# Patient Record
Sex: FEMALE | Race: WHITE | Employment: OTHER | URBAN - METROPOLITAN AREA
[De-identification: names, ages, dates, MRNs, and addresses within clinical notes are randomized per-mention and may not be internally consistent; named-entity substitution may affect disease eponyms.]

---

## 2017-08-20 ENCOUNTER — HOSPITAL ENCOUNTER (EMERGENCY)
Age: 72
Discharge: HOME | DRG: 746 | End: 2017-08-20
Attending: EMERGENCY MEDICINE
Payer: MEDICARE

## 2017-08-20 VITALS
SYSTOLIC BLOOD PRESSURE: 172 MMHG | BODY MASS INDEX: 26.12 KG/M2 | TEMPERATURE: 97.7 F | RESPIRATION RATE: 16 BRPM | HEART RATE: 91 BPM | OXYGEN SATURATION: 96 % | WEIGHT: 153 LBS | DIASTOLIC BLOOD PRESSURE: 85 MMHG | HEIGHT: 64 IN

## 2017-08-20 DIAGNOSIS — L03.115 CELLULITIS OF RIGHT LOWER EXTREMITY: Primary | ICD-10-CM

## 2017-08-20 PROCEDURE — 99281 EMR DPT VST MAYX REQ PHY/QHP: CPT

## 2017-08-20 RX ORDER — CLINDAMYCIN HYDROCHLORIDE 150 MG/1
300 CAPSULE ORAL 4 TIMES DAILY
Qty: 56 CAPSULE | Refills: 0 | Status: SHIPPED | OUTPATIENT
Start: 2017-08-20 | End: 2017-08-27

## 2017-08-20 RX ORDER — CEPHALEXIN 500 MG/1
500 CAPSULE ORAL 4 TIMES DAILY
Qty: 28 CAPSULE | Refills: 0 | OUTPATIENT
Start: 2017-08-20 | End: 2017-08-20

## 2017-08-20 RX ORDER — SULFAMETHOXAZOLE AND TRIMETHOPRIM 800; 160 MG/1; MG/1
1 TABLET ORAL 2 TIMES DAILY
Qty: 14 TABLET | Refills: 0 | OUTPATIENT
Start: 2017-08-20 | End: 2017-08-20

## 2017-08-20 RX ORDER — CLINDAMYCIN HYDROCHLORIDE 150 MG/1
300 CAPSULE ORAL 4 TIMES DAILY
Qty: 56 CAPSULE | Refills: 0 | OUTPATIENT
Start: 2017-08-20 | End: 2017-08-20

## 2017-08-20 RX ORDER — HYDROCODONE BITARTRATE AND ACETAMINOPHEN 5; 325 MG/1; MG/1
1 TABLET ORAL EVERY 6 HOURS PRN
Qty: 10 TABLET | Refills: 0 | Status: SHIPPED | OUTPATIENT
Start: 2017-08-20 | End: 2017-08-27

## 2017-08-20 RX ORDER — HYDROCODONE BITARTRATE AND ACETAMINOPHEN 5; 325 MG/1; MG/1
1 TABLET ORAL EVERY 6 HOURS PRN
Qty: 10 TABLET | Refills: 0 | OUTPATIENT
Start: 2017-08-20 | End: 2017-08-20

## 2017-08-20 ASSESSMENT — PAIN SCALES - GENERAL: PAINLEVEL_OUTOF10: 7

## 2017-08-20 ASSESSMENT — PAIN DESCRIPTION - PAIN TYPE: TYPE: ACUTE PAIN

## 2017-08-21 ENCOUNTER — HOSPITAL ENCOUNTER (INPATIENT)
Age: 72
LOS: 3 days | Discharge: HOME HEALTH | DRG: 746 | End: 2017-08-24
Attending: EMERGENCY MEDICINE | Admitting: INTERNAL MEDICINE
Payer: MEDICARE

## 2017-08-21 DIAGNOSIS — R00.0 TACHYCARDIA: ICD-10-CM

## 2017-08-21 DIAGNOSIS — Z78.9 FAILURE OF OUTPATIENT TREATMENT: ICD-10-CM

## 2017-08-21 DIAGNOSIS — L03.317 CELLULITIS OF BUTTOCK: Primary | ICD-10-CM

## 2017-08-21 LAB
ABSOLUTE EOS #: 0.1 K/UL (ref 0–0.4)
ABSOLUTE LYMPH #: 1.3 K/UL (ref 1–4.8)
ABSOLUTE MONO #: 0.6 K/UL (ref 0.1–1.3)
ALBUMIN SERPL-MCNC: 4.3 G/DL (ref 3.5–5.2)
ALBUMIN/GLOBULIN RATIO: ABNORMAL (ref 1–2.5)
ALP BLD-CCNC: 75 U/L (ref 35–104)
ALT SERPL-CCNC: 14 U/L (ref 5–33)
ANION GAP SERPL CALCULATED.3IONS-SCNC: 16 MMOL/L (ref 9–17)
AST SERPL-CCNC: 17 U/L
BASOPHILS # BLD: 1 %
BASOPHILS ABSOLUTE: 0.1 K/UL (ref 0–0.2)
BILIRUB SERPL-MCNC: 0.49 MG/DL (ref 0.3–1.2)
BUN BLDV-MCNC: 14 MG/DL (ref 8–23)
BUN/CREAT BLD: ABNORMAL (ref 9–20)
CALCIUM SERPL-MCNC: 9 MG/DL (ref 8.6–10.4)
CHLORIDE BLD-SCNC: 103 MMOL/L (ref 98–107)
CO2: 22 MMOL/L (ref 20–31)
CREAT SERPL-MCNC: 0.59 MG/DL (ref 0.5–0.9)
DIFFERENTIAL TYPE: NORMAL
EOSINOPHILS RELATIVE PERCENT: 1 %
GFR AFRICAN AMERICAN: >60 ML/MIN
GFR NON-AFRICAN AMERICAN: >60 ML/MIN
GFR SERPL CREATININE-BSD FRML MDRD: ABNORMAL ML/MIN/{1.73_M2}
GFR SERPL CREATININE-BSD FRML MDRD: ABNORMAL ML/MIN/{1.73_M2}
GLUCOSE BLD-MCNC: 107 MG/DL (ref 70–99)
HCT VFR BLD CALC: 40.7 % (ref 36–46)
HEMOGLOBIN: 13.8 G/DL (ref 12–16)
LACTIC ACID, WHOLE BLOOD: NORMAL MMOL/L (ref 0.7–2.1)
LACTIC ACID: 0.9 MMOL/L (ref 0.5–2.2)
LYMPHOCYTES # BLD: 14 %
MCH RBC QN AUTO: 31.8 PG (ref 26–34)
MCHC RBC AUTO-ENTMCNC: 33.8 G/DL (ref 31–37)
MCV RBC AUTO: 93.9 FL (ref 80–100)
MONOCYTES # BLD: 7 %
PDW BLD-RTO: 13.9 % (ref 11.5–14.9)
PLATELET # BLD: 213 K/UL (ref 150–450)
PLATELET ESTIMATE: NORMAL
PMV BLD AUTO: 8.5 FL (ref 6–12)
POTASSIUM SERPL-SCNC: 3.6 MMOL/L (ref 3.7–5.3)
RBC # BLD: 4.33 M/UL (ref 4–5.2)
RBC # BLD: NORMAL 10*6/UL
SEG NEUTROPHILS: 77 %
SEGMENTED NEUTROPHILS ABSOLUTE COUNT: 7.2 K/UL (ref 1.3–9.1)
SODIUM BLD-SCNC: 141 MMOL/L (ref 135–144)
TOTAL PROTEIN: 7.5 G/DL (ref 6.4–8.3)
WBC # BLD: 9.3 K/UL (ref 3.5–11)
WBC # BLD: NORMAL 10*3/UL

## 2017-08-21 PROCEDURE — 83605 ASSAY OF LACTIC ACID: CPT

## 2017-08-21 PROCEDURE — 96374 THER/PROPH/DIAG INJ IV PUSH: CPT

## 2017-08-21 PROCEDURE — 83036 HEMOGLOBIN GLYCOSYLATED A1C: CPT

## 2017-08-21 PROCEDURE — 85025 COMPLETE CBC W/AUTO DIFF WBC: CPT

## 2017-08-21 PROCEDURE — 99284 EMERGENCY DEPT VISIT MOD MDM: CPT

## 2017-08-21 PROCEDURE — 2500000003 HC RX 250 WO HCPCS: Performed by: EMERGENCY MEDICINE

## 2017-08-21 PROCEDURE — 6360000002 HC RX W HCPCS: Performed by: EMERGENCY MEDICINE

## 2017-08-21 PROCEDURE — 1200000000 HC SEMI PRIVATE

## 2017-08-21 PROCEDURE — 80053 COMPREHEN METABOLIC PANEL: CPT

## 2017-08-21 PROCEDURE — 2580000003 HC RX 258: Performed by: EMERGENCY MEDICINE

## 2017-08-21 PROCEDURE — 36415 COLL VENOUS BLD VENIPUNCTURE: CPT

## 2017-08-21 RX ORDER — 0.9 % SODIUM CHLORIDE 0.9 %
1000 INTRAVENOUS SOLUTION INTRAVENOUS ONCE
Status: COMPLETED | OUTPATIENT
Start: 2017-08-21 | End: 2017-08-21

## 2017-08-21 RX ORDER — KETOROLAC TROMETHAMINE 30 MG/ML
30 INJECTION, SOLUTION INTRAMUSCULAR; INTRAVENOUS ONCE
Status: COMPLETED | OUTPATIENT
Start: 2017-08-21 | End: 2017-08-21

## 2017-08-21 RX ORDER — SODIUM CHLORIDE 0.9 % (FLUSH) 0.9 %
10 SYRINGE (ML) INJECTION EVERY 12 HOURS SCHEDULED
Status: DISCONTINUED | OUTPATIENT
Start: 2017-08-22 | End: 2017-08-24 | Stop reason: HOSPADM

## 2017-08-21 RX ORDER — SODIUM CHLORIDE 0.9 % (FLUSH) 0.9 %
10 SYRINGE (ML) INJECTION PRN
Status: DISCONTINUED | OUTPATIENT
Start: 2017-08-21 | End: 2017-08-24 | Stop reason: HOSPADM

## 2017-08-21 RX ORDER — SODIUM CHLORIDE 9 MG/ML
INJECTION, SOLUTION INTRAVENOUS CONTINUOUS
Status: DISCONTINUED | OUTPATIENT
Start: 2017-08-22 | End: 2017-08-24 | Stop reason: HOSPADM

## 2017-08-21 RX ADMIN — KETOROLAC TROMETHAMINE 30 MG: 30 INJECTION, SOLUTION INTRAMUSCULAR at 22:20

## 2017-08-21 RX ADMIN — DEXTROSE 600 MG: 50 INJECTION, SOLUTION INTRAVENOUS at 22:49

## 2017-08-21 RX ADMIN — SODIUM CHLORIDE 1000 ML: 9 INJECTION, SOLUTION INTRAVENOUS at 22:21

## 2017-08-21 ASSESSMENT — PAIN SCALES - GENERAL
PAINLEVEL_OUTOF10: 9
PAINLEVEL_OUTOF10: 9

## 2017-08-22 LAB
ESTIMATED AVERAGE GLUCOSE: 103 MG/DL
HBA1C MFR BLD: 5.2 % (ref 4–6)

## 2017-08-22 PROCEDURE — 6360000002 HC RX W HCPCS: Performed by: INTERNAL MEDICINE

## 2017-08-22 PROCEDURE — 2500000003 HC RX 250 WO HCPCS: Performed by: INTERNAL MEDICINE

## 2017-08-22 PROCEDURE — 2580000003 HC RX 258: Performed by: INTERNAL MEDICINE

## 2017-08-22 PROCEDURE — 99223 1ST HOSP IP/OBS HIGH 75: CPT | Performed by: INTERNAL MEDICINE

## 2017-08-22 PROCEDURE — 1200000000 HC SEMI PRIVATE

## 2017-08-22 PROCEDURE — 6370000000 HC RX 637 (ALT 250 FOR IP): Performed by: INTERNAL MEDICINE

## 2017-08-22 RX ORDER — PAROXETINE 10 MG/1
10 TABLET, FILM COATED ORAL NIGHTLY
COMMUNITY

## 2017-08-22 RX ORDER — PAROXETINE 10 MG/1
10 TABLET, FILM COATED ORAL NIGHTLY
Status: DISCONTINUED | OUTPATIENT
Start: 2017-08-22 | End: 2017-08-23

## 2017-08-22 RX ORDER — ZOLPIDEM TARTRATE 5 MG/1
5 TABLET ORAL NIGHTLY PRN
COMMUNITY

## 2017-08-22 RX ORDER — ONDANSETRON 2 MG/ML
4 INJECTION INTRAMUSCULAR; INTRAVENOUS EVERY 6 HOURS PRN
Status: DISCONTINUED | OUTPATIENT
Start: 2017-08-22 | End: 2017-08-23 | Stop reason: SDUPTHER

## 2017-08-22 RX ORDER — HYDROCODONE BITARTRATE AND ACETAMINOPHEN 5; 325 MG/1; MG/1
0.5 TABLET ORAL EVERY 6 HOURS PRN
Status: CANCELLED | OUTPATIENT
Start: 2017-08-22

## 2017-08-22 RX ORDER — ZOLPIDEM TARTRATE 5 MG/1
5 TABLET ORAL NIGHTLY PRN
Status: DISCONTINUED | OUTPATIENT
Start: 2017-08-22 | End: 2017-08-24 | Stop reason: HOSPADM

## 2017-08-22 RX ORDER — HYDROCODONE BITARTRATE AND ACETAMINOPHEN 5; 325 MG/1; MG/1
1 TABLET ORAL EVERY 6 HOURS PRN
Status: DISCONTINUED | OUTPATIENT
Start: 2017-08-22 | End: 2017-08-24 | Stop reason: HOSPADM

## 2017-08-22 RX ORDER — ZOLPIDEM TARTRATE 5 MG/1
5 TABLET ORAL NIGHTLY PRN
Status: DISCONTINUED | OUTPATIENT
Start: 2017-08-22 | End: 2017-08-22

## 2017-08-22 RX ORDER — HYDROCODONE BITARTRATE AND ACETAMINOPHEN 5; 325 MG/1; MG/1
0.5 TABLET ORAL EVERY 6 HOURS PRN
Status: DISCONTINUED | OUTPATIENT
Start: 2017-08-22 | End: 2017-08-24 | Stop reason: HOSPADM

## 2017-08-22 RX ADMIN — ONDANSETRON 4 MG: 2 INJECTION INTRAMUSCULAR; INTRAVENOUS at 15:31

## 2017-08-22 RX ADMIN — SODIUM CHLORIDE: 9 INJECTION, SOLUTION INTRAVENOUS at 01:08

## 2017-08-22 RX ADMIN — ZOLPIDEM TARTRATE 5 MG: 5 TABLET, FILM COATED ORAL at 21:31

## 2017-08-22 RX ADMIN — ONDANSETRON 4 MG: 2 INJECTION INTRAMUSCULAR; INTRAVENOUS at 10:13

## 2017-08-22 RX ADMIN — ENOXAPARIN SODIUM 40 MG: 40 INJECTION SUBCUTANEOUS at 10:13

## 2017-08-22 RX ADMIN — PAROXETINE 10 MG: 10 TABLET, FILM COATED ORAL at 21:16

## 2017-08-22 RX ADMIN — HYDROCODONE BITARTRATE AND ACETAMINOPHEN 1 TABLET: 5; 325 TABLET ORAL at 15:31

## 2017-08-22 RX ADMIN — ONDANSETRON 4 MG: 2 INJECTION INTRAMUSCULAR; INTRAVENOUS at 21:23

## 2017-08-22 RX ADMIN — PAROXETINE 10 MG: 10 TABLET, FILM COATED ORAL at 01:08

## 2017-08-22 RX ADMIN — CLINDAMYCIN PHOSPHATE 600 MG: 150 INJECTION, SOLUTION, CONCENTRATE INTRAVENOUS at 17:27

## 2017-08-22 RX ADMIN — HYDROCODONE BITARTRATE AND ACETAMINOPHEN 1 TABLET: 5; 325 TABLET ORAL at 10:13

## 2017-08-22 RX ADMIN — CLINDAMYCIN PHOSPHATE 600 MG: 150 INJECTION, SOLUTION, CONCENTRATE INTRAVENOUS at 04:42

## 2017-08-22 RX ADMIN — HYDROCODONE BITARTRATE AND ACETAMINOPHEN 1 TABLET: 5; 325 TABLET ORAL at 21:23

## 2017-08-22 RX ADMIN — ZOLPIDEM TARTRATE 5 MG: 5 TABLET, FILM COATED ORAL at 01:08

## 2017-08-22 ASSESSMENT — PAIN SCALES - GENERAL
PAINLEVEL_OUTOF10: 4
PAINLEVEL_OUTOF10: 8
PAINLEVEL_OUTOF10: 8
PAINLEVEL_OUTOF10: 4
PAINLEVEL_OUTOF10: 5
PAINLEVEL_OUTOF10: 3

## 2017-08-22 ASSESSMENT — PAIN DESCRIPTION - FREQUENCY: FREQUENCY: CONTINUOUS

## 2017-08-22 ASSESSMENT — PAIN DESCRIPTION - LOCATION
LOCATION: BUTTOCKS
LOCATION: BUTTOCKS

## 2017-08-22 ASSESSMENT — PAIN DESCRIPTION - PAIN TYPE
TYPE: ACUTE PAIN
TYPE: ACUTE PAIN

## 2017-08-22 ASSESSMENT — PAIN DESCRIPTION - DESCRIPTORS: DESCRIPTORS: PRESSURE

## 2017-08-23 ENCOUNTER — ANESTHESIA EVENT (OUTPATIENT)
Dept: OPERATING ROOM | Age: 72
DRG: 746 | End: 2017-08-23
Payer: MEDICARE

## 2017-08-23 ENCOUNTER — ANESTHESIA (OUTPATIENT)
Dept: OPERATING ROOM | Age: 72
DRG: 746 | End: 2017-08-23
Payer: MEDICARE

## 2017-08-23 VITALS — OXYGEN SATURATION: 98 % | DIASTOLIC BLOOD PRESSURE: 51 MMHG | SYSTOLIC BLOOD PRESSURE: 81 MMHG

## 2017-08-23 LAB
ANION GAP SERPL CALCULATED.3IONS-SCNC: 14 MMOL/L (ref 9–17)
BUN BLDV-MCNC: 9 MG/DL (ref 8–23)
BUN/CREAT BLD: NORMAL (ref 9–20)
CALCIUM SERPL-MCNC: 8.7 MG/DL (ref 8.6–10.4)
CHLORIDE BLD-SCNC: 107 MMOL/L (ref 98–107)
CO2: 21 MMOL/L (ref 20–31)
CREAT SERPL-MCNC: 0.52 MG/DL (ref 0.5–0.9)
GFR AFRICAN AMERICAN: >60 ML/MIN
GFR NON-AFRICAN AMERICAN: >60 ML/MIN
GFR SERPL CREATININE-BSD FRML MDRD: NORMAL ML/MIN/{1.73_M2}
GFR SERPL CREATININE-BSD FRML MDRD: NORMAL ML/MIN/{1.73_M2}
GLUCOSE BLD-MCNC: 95 MG/DL (ref 70–99)
HCT VFR BLD CALC: 38.9 % (ref 36–46)
HEMOGLOBIN: 13.4 G/DL (ref 12–16)
MCH RBC QN AUTO: 31.9 PG (ref 26–34)
MCHC RBC AUTO-ENTMCNC: 34.4 G/DL (ref 31–37)
MCV RBC AUTO: 92.7 FL (ref 80–100)
PDW BLD-RTO: 13.7 % (ref 11.5–14.9)
PLATELET # BLD: 212 K/UL (ref 150–450)
PMV BLD AUTO: 8.4 FL (ref 6–12)
POTASSIUM SERPL-SCNC: 4 MMOL/L (ref 3.7–5.3)
RBC # BLD: 4.2 M/UL (ref 4–5.2)
SODIUM BLD-SCNC: 142 MMOL/L (ref 135–144)
WBC # BLD: 5.6 K/UL (ref 3.5–11)

## 2017-08-23 PROCEDURE — 1200000000 HC SEMI PRIVATE

## 2017-08-23 PROCEDURE — 3600000002 HC SURGERY LEVEL 2 BASE: Performed by: SURGERY

## 2017-08-23 PROCEDURE — 99232 SBSQ HOSP IP/OBS MODERATE 35: CPT | Performed by: INTERNAL MEDICINE

## 2017-08-23 PROCEDURE — 87070 CULTURE OTHR SPECIMN AEROBIC: CPT

## 2017-08-23 PROCEDURE — 7100000000 HC PACU RECOVERY - FIRST 15 MIN: Performed by: SURGERY

## 2017-08-23 PROCEDURE — 2580000003 HC RX 258: Performed by: INTERNAL MEDICINE

## 2017-08-23 PROCEDURE — 87075 CULTR BACTERIA EXCEPT BLOOD: CPT

## 2017-08-23 PROCEDURE — 7100000001 HC PACU RECOVERY - ADDTL 15 MIN: Performed by: SURGERY

## 2017-08-23 PROCEDURE — 36415 COLL VENOUS BLD VENIPUNCTURE: CPT

## 2017-08-23 PROCEDURE — 6360000002 HC RX W HCPCS: Performed by: INTERNAL MEDICINE

## 2017-08-23 PROCEDURE — 87186 SC STD MICRODIL/AGAR DIL: CPT

## 2017-08-23 PROCEDURE — 87205 SMEAR GRAM STAIN: CPT

## 2017-08-23 PROCEDURE — 6370000000 HC RX 637 (ALT 250 FOR IP): Performed by: INTERNAL MEDICINE

## 2017-08-23 PROCEDURE — 6360000002 HC RX W HCPCS: Performed by: NURSE ANESTHETIST, CERTIFIED REGISTERED

## 2017-08-23 PROCEDURE — 6370000000 HC RX 637 (ALT 250 FOR IP): Performed by: SURGERY

## 2017-08-23 PROCEDURE — 85027 COMPLETE CBC AUTOMATED: CPT

## 2017-08-23 PROCEDURE — 86403 PARTICLE AGGLUT ANTBDY SCRN: CPT

## 2017-08-23 PROCEDURE — 2500000003 HC RX 250 WO HCPCS: Performed by: SURGERY

## 2017-08-23 PROCEDURE — 3700000000 HC ANESTHESIA ATTENDED CARE: Performed by: SURGERY

## 2017-08-23 PROCEDURE — 3600000012 HC SURGERY LEVEL 2 ADDTL 15MIN: Performed by: SURGERY

## 2017-08-23 PROCEDURE — 0U9M0ZZ DRAINAGE OF VULVA, OPEN APPROACH: ICD-10-PCS | Performed by: INTERNAL MEDICINE

## 2017-08-23 PROCEDURE — 2500000003 HC RX 250 WO HCPCS: Performed by: INTERNAL MEDICINE

## 2017-08-23 PROCEDURE — 0Y900ZZ DRAINAGE OF RIGHT BUTTOCK, OPEN APPROACH: ICD-10-PCS | Performed by: INTERNAL MEDICINE

## 2017-08-23 PROCEDURE — 2580000003 HC RX 258: Performed by: SURGERY

## 2017-08-23 PROCEDURE — 3700000001 HC ADD 15 MINUTES (ANESTHESIA): Performed by: SURGERY

## 2017-08-23 PROCEDURE — 2500000003 HC RX 250 WO HCPCS: Performed by: NURSE ANESTHETIST, CERTIFIED REGISTERED

## 2017-08-23 PROCEDURE — 6360000002 HC RX W HCPCS: Performed by: SURGERY

## 2017-08-23 PROCEDURE — 80048 BASIC METABOLIC PNL TOTAL CA: CPT

## 2017-08-23 RX ORDER — LIDOCAINE HYDROCHLORIDE 10 MG/ML
INJECTION, SOLUTION EPIDURAL; INFILTRATION; INTRACAUDAL; PERINEURAL PRN
Status: DISCONTINUED | OUTPATIENT
Start: 2017-08-23 | End: 2017-08-23 | Stop reason: SDUPTHER

## 2017-08-23 RX ORDER — OXYCODONE HYDROCHLORIDE AND ACETAMINOPHEN 5; 325 MG/1; MG/1
1 TABLET ORAL EVERY 4 HOURS PRN
Status: DISCONTINUED | OUTPATIENT
Start: 2017-08-23 | End: 2017-08-24 | Stop reason: HOSPADM

## 2017-08-23 RX ORDER — DIPHENHYDRAMINE HYDROCHLORIDE 50 MG/ML
12.5 INJECTION INTRAMUSCULAR; INTRAVENOUS
Status: DISCONTINUED | OUTPATIENT
Start: 2017-08-23 | End: 2017-08-23 | Stop reason: HOSPADM

## 2017-08-23 RX ORDER — LABETALOL HYDROCHLORIDE 5 MG/ML
5 INJECTION, SOLUTION INTRAVENOUS EVERY 10 MIN PRN
Status: DISCONTINUED | OUTPATIENT
Start: 2017-08-23 | End: 2017-08-23 | Stop reason: HOSPADM

## 2017-08-23 RX ORDER — ONDANSETRON 2 MG/ML
4 INJECTION INTRAMUSCULAR; INTRAVENOUS
Status: DISCONTINUED | OUTPATIENT
Start: 2017-08-23 | End: 2017-08-23 | Stop reason: HOSPADM

## 2017-08-23 RX ORDER — MEPERIDINE HYDROCHLORIDE 25 MG/ML
12.5 INJECTION INTRAMUSCULAR; INTRAVENOUS; SUBCUTANEOUS EVERY 5 MIN PRN
Status: DISCONTINUED | OUTPATIENT
Start: 2017-08-23 | End: 2017-08-23 | Stop reason: HOSPADM

## 2017-08-23 RX ORDER — ACETAMINOPHEN 325 MG/1
650 TABLET ORAL EVERY 4 HOURS PRN
Status: DISCONTINUED | OUTPATIENT
Start: 2017-08-23 | End: 2017-08-24 | Stop reason: HOSPADM

## 2017-08-23 RX ORDER — SODIUM CHLORIDE 9 MG/ML
INJECTION, SOLUTION INTRAVENOUS CONTINUOUS
Status: DISCONTINUED | OUTPATIENT
Start: 2017-08-23 | End: 2017-08-24 | Stop reason: HOSPADM

## 2017-08-23 RX ORDER — SODIUM CHLORIDE 0.9 % (FLUSH) 0.9 %
10 SYRINGE (ML) INJECTION EVERY 12 HOURS SCHEDULED
Status: DISCONTINUED | OUTPATIENT
Start: 2017-08-23 | End: 2017-08-24 | Stop reason: HOSPADM

## 2017-08-23 RX ORDER — BUPIVACAINE HYDROCHLORIDE 5 MG/ML
INJECTION, SOLUTION EPIDURAL; INTRACAUDAL PRN
Status: DISCONTINUED | OUTPATIENT
Start: 2017-08-23 | End: 2017-08-23 | Stop reason: HOSPADM

## 2017-08-23 RX ORDER — MORPHINE SULFATE 4 MG/ML
2 INJECTION, SOLUTION INTRAMUSCULAR; INTRAVENOUS
Status: DISCONTINUED | OUTPATIENT
Start: 2017-08-23 | End: 2017-08-24 | Stop reason: HOSPADM

## 2017-08-23 RX ORDER — MORPHINE SULFATE 2 MG/ML
1 INJECTION, SOLUTION INTRAMUSCULAR; INTRAVENOUS EVERY 5 MIN PRN
Status: DISCONTINUED | OUTPATIENT
Start: 2017-08-23 | End: 2017-08-23 | Stop reason: HOSPADM

## 2017-08-23 RX ORDER — PROPOFOL 10 MG/ML
INJECTION, EMULSION INTRAVENOUS PRN
Status: DISCONTINUED | OUTPATIENT
Start: 2017-08-23 | End: 2017-08-23 | Stop reason: SDUPTHER

## 2017-08-23 RX ORDER — SODIUM CHLORIDE 0.9 % (FLUSH) 0.9 %
10 SYRINGE (ML) INJECTION PRN
Status: DISCONTINUED | OUTPATIENT
Start: 2017-08-23 | End: 2017-08-24 | Stop reason: HOSPADM

## 2017-08-23 RX ORDER — OXYCODONE HYDROCHLORIDE AND ACETAMINOPHEN 5; 325 MG/1; MG/1
2 TABLET ORAL EVERY 4 HOURS PRN
Status: DISCONTINUED | OUTPATIENT
Start: 2017-08-23 | End: 2017-08-24 | Stop reason: HOSPADM

## 2017-08-23 RX ORDER — HYDROCODONE BITARTRATE AND ACETAMINOPHEN 5; 325 MG/1; MG/1
1 TABLET ORAL PRN
Status: DISCONTINUED | OUTPATIENT
Start: 2017-08-23 | End: 2017-08-23 | Stop reason: HOSPADM

## 2017-08-23 RX ORDER — FENTANYL CITRATE 50 UG/ML
25 INJECTION, SOLUTION INTRAMUSCULAR; INTRAVENOUS EVERY 5 MIN PRN
Status: DISCONTINUED | OUTPATIENT
Start: 2017-08-23 | End: 2017-08-23 | Stop reason: HOSPADM

## 2017-08-23 RX ORDER — HYDROCODONE BITARTRATE AND ACETAMINOPHEN 5; 325 MG/1; MG/1
2 TABLET ORAL PRN
Status: DISCONTINUED | OUTPATIENT
Start: 2017-08-23 | End: 2017-08-23 | Stop reason: HOSPADM

## 2017-08-23 RX ORDER — HYDRALAZINE HYDROCHLORIDE 20 MG/ML
5 INJECTION INTRAMUSCULAR; INTRAVENOUS EVERY 10 MIN PRN
Status: DISCONTINUED | OUTPATIENT
Start: 2017-08-23 | End: 2017-08-23 | Stop reason: HOSPADM

## 2017-08-23 RX ORDER — PAROXETINE HYDROCHLORIDE 20 MG/1
20 TABLET, FILM COATED ORAL NIGHTLY
Status: DISCONTINUED | OUTPATIENT
Start: 2017-08-23 | End: 2017-08-24 | Stop reason: HOSPADM

## 2017-08-23 RX ORDER — METOCLOPRAMIDE HYDROCHLORIDE 5 MG/ML
10 INJECTION INTRAMUSCULAR; INTRAVENOUS
Status: DISCONTINUED | OUTPATIENT
Start: 2017-08-23 | End: 2017-08-23 | Stop reason: HOSPADM

## 2017-08-23 RX ORDER — FENTANYL CITRATE 50 UG/ML
INJECTION, SOLUTION INTRAMUSCULAR; INTRAVENOUS PRN
Status: DISCONTINUED | OUTPATIENT
Start: 2017-08-23 | End: 2017-08-23 | Stop reason: SDUPTHER

## 2017-08-23 RX ORDER — FENTANYL CITRATE 50 UG/ML
50 INJECTION, SOLUTION INTRAMUSCULAR; INTRAVENOUS EVERY 5 MIN PRN
Status: DISCONTINUED | OUTPATIENT
Start: 2017-08-23 | End: 2017-08-23 | Stop reason: HOSPADM

## 2017-08-23 RX ORDER — MORPHINE SULFATE 4 MG/ML
4 INJECTION, SOLUTION INTRAMUSCULAR; INTRAVENOUS
Status: DISCONTINUED | OUTPATIENT
Start: 2017-08-23 | End: 2017-08-24 | Stop reason: HOSPADM

## 2017-08-23 RX ORDER — DEXAMETHASONE SODIUM PHOSPHATE 4 MG/ML
INJECTION, SOLUTION INTRA-ARTICULAR; INTRALESIONAL; INTRAMUSCULAR; INTRAVENOUS; SOFT TISSUE PRN
Status: DISCONTINUED | OUTPATIENT
Start: 2017-08-23 | End: 2017-08-23 | Stop reason: SDUPTHER

## 2017-08-23 RX ORDER — ONDANSETRON 2 MG/ML
INJECTION INTRAMUSCULAR; INTRAVENOUS PRN
Status: DISCONTINUED | OUTPATIENT
Start: 2017-08-23 | End: 2017-08-23 | Stop reason: SDUPTHER

## 2017-08-23 RX ORDER — ONDANSETRON 2 MG/ML
4 INJECTION INTRAMUSCULAR; INTRAVENOUS EVERY 6 HOURS PRN
Status: DISCONTINUED | OUTPATIENT
Start: 2017-08-23 | End: 2017-08-24 | Stop reason: HOSPADM

## 2017-08-23 RX ORDER — MIDAZOLAM HYDROCHLORIDE 1 MG/ML
INJECTION INTRAMUSCULAR; INTRAVENOUS PRN
Status: DISCONTINUED | OUTPATIENT
Start: 2017-08-23 | End: 2017-08-23 | Stop reason: SDUPTHER

## 2017-08-23 RX ADMIN — ZOLPIDEM TARTRATE 5 MG: 5 TABLET, FILM COATED ORAL at 22:19

## 2017-08-23 RX ADMIN — SODIUM CHLORIDE: 9 INJECTION, SOLUTION INTRAVENOUS at 13:37

## 2017-08-23 RX ADMIN — CLINDAMYCIN PHOSPHATE 600 MG: 150 INJECTION, SOLUTION, CONCENTRATE INTRAVENOUS at 01:57

## 2017-08-23 RX ADMIN — FENTANYL CITRATE 50 MCG: 50 INJECTION, SOLUTION INTRAMUSCULAR; INTRAVENOUS at 13:39

## 2017-08-23 RX ADMIN — ONDANSETRON 4 MG: 2 INJECTION INTRAMUSCULAR; INTRAVENOUS at 13:59

## 2017-08-23 RX ADMIN — ONDANSETRON 4 MG: 2 INJECTION INTRAMUSCULAR; INTRAVENOUS at 10:43

## 2017-08-23 RX ADMIN — HYDROCODONE BITARTRATE AND ACETAMINOPHEN 1 TABLET: 5; 325 TABLET ORAL at 10:43

## 2017-08-23 RX ADMIN — CLINDAMYCIN PHOSPHATE 600 MG: 150 INJECTION, SOLUTION, CONCENTRATE INTRAVENOUS at 17:11

## 2017-08-23 RX ADMIN — LIDOCAINE HYDROCHLORIDE 50 MG: 10 INJECTION, SOLUTION EPIDURAL; INFILTRATION; INTRACAUDAL; PERINEURAL at 13:39

## 2017-08-23 RX ADMIN — SODIUM CHLORIDE: 9 INJECTION, SOLUTION INTRAVENOUS at 22:19

## 2017-08-23 RX ADMIN — PROPOFOL 150 MG: 10 INJECTION, EMULSION INTRAVENOUS at 13:39

## 2017-08-23 RX ADMIN — OXYCODONE HYDROCHLORIDE AND ACETAMINOPHEN 2 TABLET: 5; 325 TABLET ORAL at 18:32

## 2017-08-23 RX ADMIN — SODIUM CHLORIDE: 9 INJECTION, SOLUTION INTRAVENOUS at 14:22

## 2017-08-23 RX ADMIN — ONDANSETRON 4 MG: 2 INJECTION INTRAMUSCULAR; INTRAVENOUS at 18:32

## 2017-08-23 RX ADMIN — PAROXETINE HYDROCHLORIDE 20 MG: 10 TABLET, FILM COATED ORAL at 22:19

## 2017-08-23 RX ADMIN — DEXAMETHASONE SODIUM PHOSPHATE 8 MG: 4 INJECTION, SOLUTION INTRAMUSCULAR; INTRAVENOUS at 13:46

## 2017-08-23 RX ADMIN — MIDAZOLAM 2 MG: 1 INJECTION INTRAMUSCULAR; INTRAVENOUS at 13:37

## 2017-08-23 RX ADMIN — CLINDAMYCIN PHOSPHATE 600 MG: 150 INJECTION, SOLUTION, CONCENTRATE INTRAVENOUS at 10:56

## 2017-08-23 RX ADMIN — SODIUM CHLORIDE: 9 INJECTION, SOLUTION INTRAVENOUS at 17:10

## 2017-08-23 RX ADMIN — FENTANYL CITRATE 50 MCG: 50 INJECTION, SOLUTION INTRAMUSCULAR; INTRAVENOUS at 13:50

## 2017-08-23 ASSESSMENT — PAIN SCALES - GENERAL
PAINLEVEL_OUTOF10: 3
PAINLEVEL_OUTOF10: 0
PAINLEVEL_OUTOF10: 3
PAINLEVEL_OUTOF10: 6
PAINLEVEL_OUTOF10: 8

## 2017-08-24 VITALS
TEMPERATURE: 98 F | DIASTOLIC BLOOD PRESSURE: 67 MMHG | RESPIRATION RATE: 16 BRPM | WEIGHT: 150 LBS | HEART RATE: 97 BPM | HEIGHT: 64 IN | OXYGEN SATURATION: 99 % | BODY MASS INDEX: 25.61 KG/M2 | SYSTOLIC BLOOD PRESSURE: 133 MMHG

## 2017-08-24 LAB
ANION GAP SERPL CALCULATED.3IONS-SCNC: 15 MMOL/L (ref 9–17)
BUN BLDV-MCNC: 10 MG/DL (ref 8–23)
BUN/CREAT BLD: ABNORMAL (ref 9–20)
CALCIUM SERPL-MCNC: 8.6 MG/DL (ref 8.6–10.4)
CHLORIDE BLD-SCNC: 106 MMOL/L (ref 98–107)
CO2: 19 MMOL/L (ref 20–31)
CREAT SERPL-MCNC: 0.5 MG/DL (ref 0.5–0.9)
GFR AFRICAN AMERICAN: >60 ML/MIN
GFR NON-AFRICAN AMERICAN: >60 ML/MIN
GFR SERPL CREATININE-BSD FRML MDRD: ABNORMAL ML/MIN/{1.73_M2}
GFR SERPL CREATININE-BSD FRML MDRD: ABNORMAL ML/MIN/{1.73_M2}
GLUCOSE BLD-MCNC: 93 MG/DL (ref 70–99)
HCT VFR BLD CALC: 38.2 % (ref 36–46)
HEMOGLOBIN: 12.6 G/DL (ref 12–16)
MCH RBC QN AUTO: 31.3 PG (ref 26–34)
MCHC RBC AUTO-ENTMCNC: 33.1 G/DL (ref 31–37)
MCV RBC AUTO: 94.5 FL (ref 80–100)
PDW BLD-RTO: 13.9 % (ref 11.5–14.9)
PLATELET # BLD: 243 K/UL (ref 150–450)
PMV BLD AUTO: 8.1 FL (ref 6–12)
POTASSIUM SERPL-SCNC: 3.7 MMOL/L (ref 3.7–5.3)
RBC # BLD: 4.04 M/UL (ref 4–5.2)
SODIUM BLD-SCNC: 140 MMOL/L (ref 135–144)
WBC # BLD: 7 K/UL (ref 3.5–11)

## 2017-08-24 PROCEDURE — 2500000003 HC RX 250 WO HCPCS: Performed by: INTERNAL MEDICINE

## 2017-08-24 PROCEDURE — 6370000000 HC RX 637 (ALT 250 FOR IP): Performed by: SURGERY

## 2017-08-24 PROCEDURE — 99239 HOSP IP/OBS DSCHRG MGMT >30: CPT | Performed by: INTERNAL MEDICINE

## 2017-08-24 PROCEDURE — 6360000002 HC RX W HCPCS: Performed by: SURGERY

## 2017-08-24 PROCEDURE — 85027 COMPLETE CBC AUTOMATED: CPT

## 2017-08-24 PROCEDURE — 2580000003 HC RX 258: Performed by: INTERNAL MEDICINE

## 2017-08-24 PROCEDURE — 80048 BASIC METABOLIC PNL TOTAL CA: CPT

## 2017-08-24 PROCEDURE — 6370000000 HC RX 637 (ALT 250 FOR IP): Performed by: INTERNAL MEDICINE

## 2017-08-24 PROCEDURE — 36415 COLL VENOUS BLD VENIPUNCTURE: CPT

## 2017-08-24 RX ORDER — ONDANSETRON 4 MG/1
4 TABLET, FILM COATED ORAL DAILY PRN
Qty: 30 TABLET | Refills: 0 | Status: SHIPPED | OUTPATIENT
Start: 2017-08-24 | End: 2021-08-10

## 2017-08-24 RX ORDER — DOXYCYCLINE HYCLATE 100 MG/1
100 CAPSULE ORAL 2 TIMES DAILY
Qty: 20 CAPSULE | Refills: 0 | Status: SHIPPED | OUTPATIENT
Start: 2017-08-24 | End: 2017-09-03

## 2017-08-24 RX ADMIN — CLINDAMYCIN PHOSPHATE 600 MG: 150 INJECTION, SOLUTION, CONCENTRATE INTRAVENOUS at 01:07

## 2017-08-24 RX ADMIN — ONDANSETRON 4 MG: 2 INJECTION INTRAMUSCULAR; INTRAVENOUS at 10:40

## 2017-08-24 RX ADMIN — ONDANSETRON 4 MG: 2 INJECTION INTRAMUSCULAR; INTRAVENOUS at 00:24

## 2017-08-24 RX ADMIN — CLINDAMYCIN PHOSPHATE 600 MG: 150 INJECTION, SOLUTION, CONCENTRATE INTRAVENOUS at 10:36

## 2017-08-24 RX ADMIN — OXYCODONE HYDROCHLORIDE AND ACETAMINOPHEN 1 TABLET: 5; 325 TABLET ORAL at 10:40

## 2017-08-24 RX ADMIN — HYDROCODONE BITARTRATE AND ACETAMINOPHEN 1 TABLET: 5; 325 TABLET ORAL at 04:31

## 2017-08-24 RX ADMIN — ENOXAPARIN SODIUM 40 MG: 40 INJECTION SUBCUTANEOUS at 08:18

## 2017-08-24 ASSESSMENT — PAIN SCALES - GENERAL
PAINLEVEL_OUTOF10: 3
PAINLEVEL_OUTOF10: 2
PAINLEVEL_OUTOF10: 3
PAINLEVEL_OUTOF10: 7
PAINLEVEL_OUTOF10: 1

## 2017-08-28 LAB
CULTURE: ABNORMAL
DIRECT EXAM: ABNORMAL
DIRECT EXAM: ABNORMAL
Lab: ABNORMAL
ORGANISM: ABNORMAL
SPECIMEN DESCRIPTION: ABNORMAL
SPECIMEN DESCRIPTION: ABNORMAL
STATUS: ABNORMAL

## 2017-09-28 ENCOUNTER — HOSPITAL ENCOUNTER (OUTPATIENT)
Dept: WOMENS IMAGING | Age: 72
Discharge: HOME | End: 2017-09-28
Payer: MEDICARE

## 2017-09-28 DIAGNOSIS — Z12.31 VISIT FOR SCREENING MAMMOGRAM: ICD-10-CM

## 2017-09-28 PROCEDURE — 77063 BREAST TOMOSYNTHESIS BI: CPT

## 2017-10-04 ENCOUNTER — HOSPITAL ENCOUNTER (OUTPATIENT)
Dept: WOMENS IMAGING | Age: 72
Discharge: HOME | End: 2017-10-04
Payer: MEDICARE

## 2017-10-04 DIAGNOSIS — R92.8 ABNORMAL MAMMOGRAM: ICD-10-CM

## 2017-10-04 PROCEDURE — 76642 ULTRASOUND BREAST LIMITED: CPT

## 2018-11-05 ENCOUNTER — HOSPITAL ENCOUNTER (OUTPATIENT)
Dept: WOMENS IMAGING | Age: 73
Discharge: HOME | End: 2018-11-07
Payer: MEDICARE

## 2018-11-05 DIAGNOSIS — Z12.31 ENCOUNTER FOR SCREENING MAMMOGRAM FOR BREAST CANCER: ICD-10-CM

## 2018-11-05 PROCEDURE — 77063 BREAST TOMOSYNTHESIS BI: CPT

## 2020-09-21 ENCOUNTER — HOSPITAL ENCOUNTER (OUTPATIENT)
Dept: WOMENS IMAGING | Age: 75
Discharge: HOME | End: 2020-09-23
Payer: MEDICARE

## 2020-09-21 PROCEDURE — 77063 BREAST TOMOSYNTHESIS BI: CPT

## 2021-08-02 ENCOUNTER — APPOINTMENT (OUTPATIENT)
Dept: GENERAL RADIOLOGY | Age: 76
End: 2021-08-02
Payer: MEDICARE

## 2021-08-02 ENCOUNTER — HOSPITAL ENCOUNTER (EMERGENCY)
Age: 76
Discharge: HOME | End: 2021-08-02
Attending: EMERGENCY MEDICINE
Payer: MEDICARE

## 2021-08-02 VITALS
RESPIRATION RATE: 16 BRPM | OXYGEN SATURATION: 97 % | HEIGHT: 64 IN | DIASTOLIC BLOOD PRESSURE: 90 MMHG | HEART RATE: 87 BPM | BODY MASS INDEX: 26.12 KG/M2 | TEMPERATURE: 98.4 F | SYSTOLIC BLOOD PRESSURE: 178 MMHG | WEIGHT: 153 LBS

## 2021-08-02 DIAGNOSIS — S52.501A CLOSED FRACTURE OF DISTAL END OF RIGHT RADIUS, UNSPECIFIED FRACTURE MORPHOLOGY, INITIAL ENCOUNTER: Primary | ICD-10-CM

## 2021-08-02 PROCEDURE — 90715 TDAP VACCINE 7 YRS/> IM: CPT | Performed by: EMERGENCY MEDICINE

## 2021-08-02 PROCEDURE — 90471 IMMUNIZATION ADMIN: CPT | Performed by: EMERGENCY MEDICINE

## 2021-08-02 PROCEDURE — 29125 APPL SHORT ARM SPLINT STATIC: CPT

## 2021-08-02 PROCEDURE — 73130 X-RAY EXAM OF HAND: CPT

## 2021-08-02 PROCEDURE — 73110 X-RAY EXAM OF WRIST: CPT

## 2021-08-02 PROCEDURE — 6360000002 HC RX W HCPCS: Performed by: EMERGENCY MEDICINE

## 2021-08-02 PROCEDURE — 6370000000 HC RX 637 (ALT 250 FOR IP): Performed by: EMERGENCY MEDICINE

## 2021-08-02 PROCEDURE — 99283 EMERGENCY DEPT VISIT LOW MDM: CPT

## 2021-08-02 RX ORDER — HYDROCODONE BITARTRATE AND ACETAMINOPHEN 5; 325 MG/1; MG/1
1 TABLET ORAL EVERY 6 HOURS PRN
Qty: 10 TABLET | Refills: 0 | Status: SHIPPED | OUTPATIENT
Start: 2021-08-02 | End: 2021-08-12 | Stop reason: SDUPTHER

## 2021-08-02 RX ORDER — ACETAMINOPHEN 500 MG
1000 TABLET ORAL ONCE
Status: COMPLETED | OUTPATIENT
Start: 2021-08-02 | End: 2021-08-02

## 2021-08-02 RX ADMIN — TETANUS TOXOID, REDUCED DIPHTHERIA TOXOID AND ACELLULAR PERTUSSIS VACCINE, ADSORBED 0.5 ML: 5; 2.5; 8; 8; 2.5 SUSPENSION INTRAMUSCULAR at 21:36

## 2021-08-02 RX ADMIN — ACETAMINOPHEN 1000 MG: 500 TABLET ORAL at 20:40

## 2021-08-02 ASSESSMENT — PAIN SCALES - GENERAL
PAINLEVEL_OUTOF10: 2
PAINLEVEL_OUTOF10: 2

## 2021-08-02 ASSESSMENT — PAIN DESCRIPTION - PAIN TYPE: TYPE: ACUTE PAIN

## 2021-08-02 ASSESSMENT — ENCOUNTER SYMPTOMS
SHORTNESS OF BREATH: 0
DIARRHEA: 0
ABDOMINAL PAIN: 0
VOMITING: 0
COUGH: 0
BACK PAIN: 0

## 2021-08-02 ASSESSMENT — PAIN DESCRIPTION - ORIENTATION: ORIENTATION: LEFT

## 2021-08-02 ASSESSMENT — PAIN DESCRIPTION - LOCATION: LOCATION: WRIST

## 2021-08-03 NOTE — ED PROVIDER NOTES
EMERGENCY DEPARTMENT ENCOUNTER    Pt Name: Yaneth Spring  MRN: [id number]  [place] 1945  Date of evaluation: 8/2/21  CHIEF COMPLAINT       Chief Complaint   Patient presents with    Wrist Injury     HISTORY OF PRESENT ILLNESS   HPI     This is a 51-year-old right-handed female comes in today after a fall just prior to arrival the patient was going to water her plants and she had a mechanical nonsyncopal fall tripping over the hose she fell on her outstretched right wrist also sustained abrasions to her left elbow and left knee she states that she does not really have pain on her left knee or now left elbow is mostly of her right wrist and her right hand especially when she moves it she did not take any medicine prior to arrival.  She did not hit her head she did not lose consciousness she was able to ambulate after the incident she cannot remember the last time she had a tetanus shot. REVIEW OF SYSTEMS     Review of Systems   Constitutional: Negative for fever. HENT: Negative for congestion. Respiratory: Negative for cough and shortness of breath. Cardiovascular: Negative for chest pain. Gastrointestinal: Negative for abdominal pain, diarrhea and vomiting. Genitourinary: Negative for dysuria. Musculoskeletal: Negative for back pain. Right wrist and hand pain   Skin: Negative for rash. Neurological: Negative for headaches. All other systems reviewed and are negative.     PASTMEDICAL HISTORY     Past Medical History:   Diagnosis Date    Anxiety     Hyperlipidemia      SURGICAL HISTORY       Past Surgical History:   Procedure Laterality Date    HYSTERECTOMY      KNEE SURGERY      RI INCIS/DRAIN THIGH/KNEE ABSCESS,DEEP Right 8/23/2017    THIGH DEBRIDEMENT INCISION AND DRAINAGE AND VAGINA DEBRIDEMENT INCISION AND DRAINAGE performed by Callie Dolan DO at [address] [place] [address]       Discharge Medication List as of 8/2/2021 10:14 PM      CONTINUE these medications which have NOT CHANGED    Details   ondansetron (ZOFRAN) 4 MG tablet Take 1 tablet by mouth daily as needed for Nausea or Vomiting, Disp-30 tablet, R-0Normal      PARoxetine (PAXIL) 10 MG tablet Take 20 mg by mouth nightly Historical Med      zolpidem (AMBIEN) 5 MG tablet Take 5 mg by mouth nightly as neededHistorical Med           ALLERGIES     is allergic to sulfa antibiotics. FAMILY HISTORY     has no family status information on file. SOCIAL HISTORY       Social History     Tobacco Use    Smoking status: Never Smoker    Smokeless tobacco: Never Used   Substance Use Topics    Alcohol use: No    Drug use: No     PHYSICAL EXAM     INITIAL VITALS: BP (!) 178/90   Pulse 87   Temp 98.4 °F (36.9 °C) (Oral)   Resp 16   Ht 5' 4\" (1.626 m)   Wt 153 lb (69.4 kg)   LMP  (LMP Unknown) Comment: Hysterectomy 1998  SpO2 97%   BMI 26.26 kg/m²    Physical Exam  Vitals and nursing note reviewed. Constitutional:       General: She is not in acute distress. Appearance: She is well-developed. HENT:      Head: Normocephalic and atraumatic. Eyes:      Conjunctiva/sclera: Conjunctivae normal.   Neck:      Comments: No midline spinal tenderness to palpation  Cardiovascular:      Rate and Rhythm: Normal rate and regular rhythm. Heart sounds: No murmur heard. No friction rub. Pulmonary:      Effort: Pulmonary effort is normal. No respiratory distress. Breath sounds: Normal breath sounds. Abdominal:      Palpations: Abdomen is soft. Tenderness: There is no abdominal tenderness. Musculoskeletal:      Cervical back: Neck supple.       Comments: No midline spinal tenderness to palpation, ecchymoses and soft tissue swelling overlying the posterior right lateral hand with tenderness to palpation tenderness to the anatomical snuffbox pain is worse with ranging of the wrist but able to range the wrist and then flex extend the fingers, superficial abrasion overlying the posterior left elbow and the anterior left knee able to range the elbow and knee without difficulty no point tenderness   Skin:     General: Skin is warm and dry. Capillary Refill: Capillary refill takes less than 2 seconds. Neurological:      Mental Status: She is alert. Comments: Able to ambulate       DIAGNOSTIC RESULTS   RADIOLOGY:All plain film, CT, MRI, and formal ultrasound images (except ED bedside ultrasound) are read by the radiologist, see reports below, unless otherwisenoted in MDM or here. XR WRIST RIGHT (MIN 3 VIEWS)   Final Result   1. Nondisplaced fracture involving the distal, medial portion of the radius,   extending into the radiocarpal joint. 2. Osteoarthritic changes, involving the D IP joints, PIP joints, and lateral   aspect of the right wrist         XR HAND RIGHT (MIN 3 VIEWS)   Final Result   1. Nondisplaced fracture involving the distal, medial portion of the radius,   extending into the radiocarpal joint. 2. Osteoarthritic changes, involving the D IP joints, PIP joints, and lateral   aspect of the right wrist             EMERGENCY DEPARTMENTCOURSE:   Differential diagnosis includes fracture dislocation musculoskeletal strain. Will update the patient's tetanus at this time she did not hit her head she did not lose consciousness do not believe she requires a CT head at this time. She does have snuffbox tenderness even if x-rays are negative we will place the patient in a splint    10:15 PM EDT  X-ray reveals nondisplaced fracture involving the distal medial portion of the radius extending into the radiocarpal joint.   With the place the patient in a sugar tong splint and have her follow-up with orthopedic surgery       Vitals:    Vitals:    08/02/21 1933   BP: (!) 178/90   Pulse: 87   Resp: 16   Temp: 98.4 °F (36.9 °C)   TempSrc: Oral   SpO2: 97%   Weight: 153 lb (69.4 kg)   Height: 5' 4\" (1.626 m)       The patient was given the following medications while in the emergency department:  Orders Placed This Encounter   Medications    acetaminophen (TYLENOL) tablet 1,000 mg    Tetanus-Diphth-Acell Pertussis (BOOSTRIX) injection 0.5 mL    HYDROcodone-acetaminophen (NORCO) 5-325 MG per tablet     Sig: Take 1 tablet by mouth every 6 hours as needed for Pain for up to 3 days. Intended supply: 3 days. Take lowest dose possible to manage pain     Dispense:  10 tablet     Refill:  0         FINAL IMPRESSION      1. Closed fracture of distal end of right radius, unspecified fracture morphology, initial encounter         DISPOSITION/PLAN   DISPOSITION Decision To Discharge 08/02/2021 10:13:31 PM      PATIENT REFERRED TO:  Karey Christianson MD  [facility], [address] [address]  [phone]    In 1 week      DISCHARGE MEDICATIONS:  Discharge Medication List as of 8/2/2021 10:14 PM      START taking these medications    Details   HYDROcodone-acetaminophen (NORCO) 5-325 MG per tablet Take 1 tablet by mouth every 6 hours as needed for Pain for up to 3 days. Intended supply: 3 days.  Take lowest dose possible to manage pain, Disp-10 tablet, R-0Print           César Leon MD  Attending Emergency Physician    This charting supersedes any ED resident or staff charting and was written using speech recognition Kyleigh Campos MD  08/02/21 9123       César Leon MD  08/08/21 2009

## 2021-08-10 ENCOUNTER — OFFICE VISIT (OUTPATIENT)
Dept: ORTHOPEDIC SURGERY | Age: 76
End: 2021-08-10
Payer: MEDICARE

## 2021-08-10 VITALS — HEIGHT: 64 IN | WEIGHT: 153 LBS | BODY MASS INDEX: 26.12 KG/M2

## 2021-08-10 DIAGNOSIS — M25.531 RIGHT WRIST PAIN: Primary | ICD-10-CM

## 2021-08-10 DIAGNOSIS — S52.571A OTHER CLOSED INTRA-ARTICULAR FRACTURE OF DISTAL END OF RIGHT RADIUS, INITIAL ENCOUNTER: ICD-10-CM

## 2021-08-10 PROCEDURE — 99204 OFFICE O/P NEW MOD 45 MIN: CPT | Performed by: PHYSICIAN ASSISTANT

## 2021-08-10 PROCEDURE — 3017F COLORECTAL CA SCREEN DOC REV: CPT | Performed by: PHYSICIAN ASSISTANT

## 2021-08-10 PROCEDURE — 1123F ACP DISCUSS/DSCN MKR DOCD: CPT | Performed by: PHYSICIAN ASSISTANT

## 2021-08-10 PROCEDURE — G8427 DOCREV CUR MEDS BY ELIG CLIN: HCPCS | Performed by: PHYSICIAN ASSISTANT

## 2021-08-10 PROCEDURE — 1036F TOBACCO NON-USER: CPT | Performed by: PHYSICIAN ASSISTANT

## 2021-08-10 PROCEDURE — G8400 PT W/DXA NO RESULTS DOC: HCPCS | Performed by: PHYSICIAN ASSISTANT

## 2021-08-10 PROCEDURE — 1090F PRES/ABSN URINE INCON ASSESS: CPT | Performed by: PHYSICIAN ASSISTANT

## 2021-08-10 PROCEDURE — G8419 CALC BMI OUT NRM PARAM NOF/U: HCPCS | Performed by: PHYSICIAN ASSISTANT

## 2021-08-10 PROCEDURE — 4040F PNEUMOC VAC/ADMIN/RCVD: CPT | Performed by: PHYSICIAN ASSISTANT

## 2021-08-10 RX ORDER — OXYBUTYNIN CHLORIDE 5 MG/1
5 TABLET, EXTENDED RELEASE ORAL DAILY
COMMUNITY
Start: 2021-07-30

## 2021-08-10 RX ORDER — PANTOPRAZOLE SODIUM 40 MG/1
40 TABLET, DELAYED RELEASE ORAL 2 TIMES DAILY
COMMUNITY
Start: 2021-07-17

## 2021-08-10 NOTE — PROGRESS NOTES
[facility], [address], [address] [facility], [id number] [place] [address]           Dept Phone: [phone]           Dept Fax:  [phone] [address] [facility]           Perla Benz          Dept Phone: [phone]           Dept Fax:  [phone]    Chief Compliant:  Chief Complaint   Patient presents with    New Patient     R Wrist        Subjective:       Gurmeet Chris is a 70 y.o. right hand-dominant female here for evaluation and treatment of a right wrist injury. The injury occurred on 8/2/2021. Mechanism of injury was: FOOSH wall watering her gardening. Since that time the patient has been experiencing right wrist  pain and swelling. The pain is currently rated moderate. The patient was originally seen at local emergency room where an x-ray was done, a fracture of the wrist was identified and the patient was placed in a splint. The patient was subsequently referred to Orthopedics for further management. The splint has remained in place and is clean and dry. Patient denies any numbness, tingling, fever or chills. Outside reports reviewed: ER records and x-ray reports. Patient's medications, allergies, past medical, surgical, social and family histories were reviewed and updated as appropriate. Review of Systems  Review of Systems   Constitutional: Negative for fever, chills, sweats, recent illness, or recent injury. Neurological: Negative for headaches, numbness, or weakness. Integumentary: Negative for rash, itching, ecchymosis, abrasions, or laceration. Musculoskeletal: Positive for New Patient (R Wrist)        Objective:   Physical Exam:  Constitutional: Patient is oriented to person, place, and time. Patient appears well-developed and well nourished.    Musculoskeletal:       General:   alert, appears stated age and cooperative   Gait: Normal   Right Wrist  Circulation:   warm, well perfused, brisk capillary refill distal to the injury   Skin:   ecchymosis   Swelling:  pain is perceived as mild (1-3  pain scale) swelling was present in the hand   Deformity:  There is not an obvious deformity of the hand. Finger ROM:   normal   Wrist ROM:  wrist flexion and extension was not assessed today secondary to pain   Sensation:   intact to light touch   Tenderness:    Point tenderness to the dorsal distal radius. No tenderness to the ulnar styloid or DRUJ. No DRUJ instability. Neurological: Patient is alert and oriented to person, place, and time. Normal strenght. No sensory deficit. Skin: Skin is warm and dry  Psychiatric: Behavior is normal. Thought content normal.  Nursing note and vitals reviewed. Imaging  Labs and Imaging:     XR taken today:  No results found. Previous Imagin2021 3 views of the right hand 3 views of the right wrist taken in the ED     FINDINGS:   Three views of the right hand demonstrate osteoarthritic change involving the   D IP and PIP joints, and 1st carpometacarpal joint.  Degenerative changes   present involving the intercarpal joint between the trapezium and navicular,   and radiocarpal joint.       Three views of the right wrist demonstrate a linear lucency coursing   vertically through the medial portion of the distal radius, extending into   the radiocarpal joint, consistent with a nondisplaced fracture.  No   additional fractures are noted.           Impression   1. Nondisplaced fracture involving the distal, medial portion of the radius,   extending into the radiocarpal joint.    2. Osteoarthritic changes, involving the D IP joints, PIP joints, and lateral   aspect of the right wrist           X-rays taken in clinic and preliminarily reviewed by me:  3 views of right wrist Taken in clinic today on 8/10/2021 again demonstrate a vertical nondisplaced fracture through the distal medial portion of the radius3 views of the right wrist no interval change from x-rays on 8/2/2021.    3 views of the right wrist after casting again demonstrate this nondisplaced vertical fracture demonstrates good alignment and good position after casting. Assessment:     1. Right wrist pain    2. Other closed intra-articular fracture of distal end of right radius, initial encounter               Plan: This is a 70 y.o. female who presents to the clinic today for evaluation of right distal radius fracture. 1. I discussed the entity of distal radius fractures with the patient. I fully outlined the anatomy regarding the wrist.  All of her questions were answered fully. 2. At this point cast immobilization will be necessary for treatment of the fracture. A short arm cast was applied and molded into position. The patient was instructed on proper cast care and the need to keep it clean and dry. 3. The patient was encouraged to keep her arm elevated and iced for the next 24-48 hours.   4. Follow up will be in 2 weeks for cast check and reevaluation with wrist x-rays in cast.

## 2021-08-12 DIAGNOSIS — S52.501A CLOSED FRACTURE OF DISTAL END OF RIGHT RADIUS, UNSPECIFIED FRACTURE MORPHOLOGY, INITIAL ENCOUNTER: ICD-10-CM

## 2021-08-12 RX ORDER — HYDROCODONE BITARTRATE AND ACETAMINOPHEN 5; 325 MG/1; MG/1
1 TABLET ORAL EVERY 6 HOURS PRN
Qty: 10 TABLET | Refills: 0 | Status: CANCELLED | OUTPATIENT
Start: 2021-08-12 | End: 2021-08-19

## 2021-08-12 RX ORDER — HYDROCODONE BITARTRATE AND ACETAMINOPHEN 5; 325 MG/1; MG/1
1 TABLET ORAL EVERY 6 HOURS PRN
Qty: 10 TABLET | Refills: 0 | Status: SHIPPED | OUTPATIENT
Start: 2021-08-12 | End: 2021-08-15

## 2021-08-12 NOTE — TELEPHONE ENCOUNTER
Pt called in requesting a medication refill on HYDROcodone-acetaminophen (Cherylene Loveless) 5-325 MG     Pt is asking for a week supply for bed time use     - Closed fracture of distal end of right radius

## 2021-08-12 NOTE — TELEPHONE ENCOUNTER
Norco script given on 8/2/21 #10 from Dr. Mendel Bunde. [facility]/[place]. Willing to refill?

## 2021-08-25 ENCOUNTER — OFFICE VISIT (OUTPATIENT)
Dept: ORTHOPEDIC SURGERY | Age: 76
End: 2021-08-25

## 2021-08-25 VITALS — BODY MASS INDEX: 26.12 KG/M2 | WEIGHT: 153 LBS | HEIGHT: 64 IN

## 2021-08-25 DIAGNOSIS — S52.501D CLOSED FRACTURE OF DISTAL END OF RIGHT RADIUS WITH ROUTINE HEALING, UNSPECIFIED FRACTURE MORPHOLOGY, SUBSEQUENT ENCOUNTER: Primary | ICD-10-CM

## 2021-08-25 PROCEDURE — 99024 POSTOP FOLLOW-UP VISIT: CPT | Performed by: PHYSICIAN ASSISTANT

## 2021-08-25 NOTE — PROGRESS NOTES
[facility], [address] [facility] [address], [address] [facility], [address] [facility]           Dept Phone: [phone]           Dept Fax:  [phone] [address] [facility]           Perla Benz          Dept Phone: [phone]           Dept Fax:  [phone]      Chief Compliant:  Chief Complaint   Patient presents with    Follow-up     R Wrist fx        History of Present Illness:  Ranjana Shrestha returns today. This is a 70 y.o. right-hand-dominant female who presents to the clinic today for follow up of right distal radius fracture. Injury occurred on 8/2/2021. Patient had a [address] injury while watering her garden. Patient eval by me on 8/10/2021 at that time was placed in a short arm cast and instructed to follow-up today for reevaluation. Patient reports unfortunate she did continue to have quite a bit of pain after I saw her however the last week and a half she has had significant improvement of pain and is doing quite well today. She reports that she has very minimal pain unless she overdoes it with this arm and is tolerating the cast well. She denies any numbness or tingling in this hand or wrist.  She states all bruising and swelling have improved significantly. Review of Systems   Constitutional: Negative for fever, chills, sweats, recent illness, or recent injury. Neurological: Negative for headaches, numbness, or weakness. Integumentary: Negative for rash, itching, ecchymosis, abrasions, or laceration. Musculoskeletal: Positive for Follow-up (R Wrist fx)       Physical Exam:  Constitutional: Patient is oriented to person, place, and time. Patient appears well-developed and well nourished.    Musculoskeletal:    Left knee  Cast Condition:  good   Circulation:   warm, well perfused, brisk capillary refill distal to the injury   Skin:   No evidence of erythema, ecchymosis, abrasions or lacerations to visible fingers or skin. Swelling:  absent   Deformity:  There is not an obvious deformity of the visualized fingers. Wrist is not visualized this cast remains in place   Finger ROM:   normal   Sensation:   intact to light touch         Neurological: Patient is alert and oriented to person, place, and time. Normal strenght. No sensory deficit. Skin: Skin is warm and dry  Psychiatric: Behavior is normal. Thought content normal.  Nursing note and vitals reviewed. Labs and Imaging:     XR taken today:  No results found. X-rays taken in clinic and preliminarily reviewed by me:  3 views of the left wrist taken in cast do demonstrate a minimally displaced intra-articular fracture there does appear to be a dye punch lesion present intra-articularly. Mild displacement compared with x-rays on 8/10/2021. Dr. Alyssa Garcia is available for review of these images and given patient's age and minimal displacement states this is well within acceptable limits. Assessment and Plan:  1. Closed fracture of distal end of right radius with routine healing, unspecified fracture morphology, subsequent encounter              PLAN:  This is a 70 y.o. female who presents to the clinic today for follow up left distal radius fracture. Patient is tolerating cast well appears to be fitting appropriately. 1.  Patient is educated on x-ray findings showing minimal displacement but continues to be within acceptable limits. 2.  At this time recommend 2 more weeks in cast  3. Follow-up in 2 weeks for reevaluation with repeat x-rays out of cast.     Please note that this chart was generated using voice recognition Dragon dictation software. Although every effort was made to ensure the accuracy of this automated transcription, some errors in transcription may have occurred.

## 2021-09-08 ENCOUNTER — OFFICE VISIT (OUTPATIENT)
Dept: ORTHOPEDIC SURGERY | Age: 76
End: 2021-09-08

## 2021-09-08 VITALS — WEIGHT: 153 LBS | BODY MASS INDEX: 26.12 KG/M2 | HEIGHT: 64 IN

## 2021-09-08 DIAGNOSIS — S52.501A CLOSED FRACTURE OF DISTAL END OF RIGHT RADIUS, UNSPECIFIED FRACTURE MORPHOLOGY, INITIAL ENCOUNTER: Primary | ICD-10-CM

## 2021-09-08 PROCEDURE — 99024 POSTOP FOLLOW-UP VISIT: CPT | Performed by: PHYSICIAN ASSISTANT

## 2021-09-10 NOTE — PROGRESS NOTES
[address] [facility], 20 [facility] [address], [address] [place] [address], [address] [facility]           Dept Phone: [phone]           Dept Fax:  [phone]           Perla Benz          Dept Phone: [phone]           Dept Fax:  [phone]      Chief Compliant:  Chief Complaint   Patient presents with    Post-Op Check     rt distal radius fracture        History of Present Illness:  Evi Phillips returns today. This is a 70 y.o. female who presents to the clinic today for follow up of right distal radius fracture which occurred on 8/2/2021. Patient was placed in a short arm cast on 8/10/2021 which she has remained in until today. Patient reports she tolerated the cast well denies any pain at this time. Upon cast removal she does note some stiffness with movement of but is overall doing very well no numbness or tingling and wrist..       Review of Systems   Constitutional: Negative for fever, chills, sweats, recent illness, or recent injury. Neurological: Negative for headaches, numbness, or weakness. Integumentary: Negative for rash, itching, ecchymosis, abrasions, or laceration. Musculoskeletal: Positive for Post-Op Check (rt distal radius fracture)       Physical Exam:  Constitutional: Patient is oriented to person, place, and time. Patient appears well-developed and well nourished. Musculoskeletal:    right Hand/Wrist    Tenderness:  None       Range of Motion:      Pronation: 80     Supination: 80     Flexion: 60     Extension: 45     Hand Joints: normal       Muscle Strength      : 5/5     Wrist Extension: 4/5     Wrist Flexion: 4/5       Sensation: normal   Phalen's Sign: Negative   Tinel's Sign (Medial Nerve): Negative   Finkelstein's Test: Negative     Neurological: Patient is alert and oriented to person, place, and time. Normal strenght.  No sensory deficit. Skin: Skin is warm and dry  Psychiatric: Behavior is normal. Thought content normal.  Nursing note and vitals reviewed. Labs and Imaging:     XR taken today:  No results found. X-rays taken in clinic and preliminarily reviewed by me:  3 views of the right wrist taken in clinic today demonstrate a minimally displaced intra-articular fracture with a dye punch lesion intra-articular. No interval displacement from x-rays on 8/25/2021. Well within acceptable limits given patient's age. Assessment and Plan:  1. Closed fracture of distal end of right radius, unspecified fracture morphology, initial encounter              PLAN:  This is a 70 y.o. female who presents to the clinic today for follow up distal radius fracture which occurred on 8/2/2021.  1. Patient was in a cast from 8/10/2021 until today. She reports some stiffness but overall is doing very well. 2. Patient demonstrates clinical union no tenderness on examination and has excellent alignment on radiographs today. 3. Cast is discontinued patient is fitted for a cock-up wrist splint however she has significant stiffness with range of motion I do recommend 2 weeks of home exercise with reevaluation in 2 weeks for range of motion check. Should patient continue to have significant stiffness at that time would likely benefit from formal PT referral.    Please note that this chart was generated using voice recognition Dragon dictation software. Although every effort was made to ensure the accuracy of this automated transcription, some errors in transcription may have occurred.

## 2021-09-23 ENCOUNTER — OFFICE VISIT (OUTPATIENT)
Dept: ORTHOPEDIC SURGERY | Age: 76
End: 2021-09-23

## 2021-09-23 VITALS — WEIGHT: 153 LBS | HEIGHT: 64 IN | BODY MASS INDEX: 26.12 KG/M2

## 2021-09-23 DIAGNOSIS — S52.591D OTHER CLOSED FRACTURE OF DISTAL END OF RIGHT RADIUS WITH ROUTINE HEALING, SUBSEQUENT ENCOUNTER: Primary | ICD-10-CM

## 2021-09-23 PROCEDURE — 99024 POSTOP FOLLOW-UP VISIT: CPT | Performed by: PHYSICIAN ASSISTANT

## 2021-09-23 RX ORDER — VITAMIN E 268 MG
400 CAPSULE ORAL 2 TIMES DAILY
COMMUNITY

## 2021-09-23 NOTE — PROGRESS NOTES
[facility], 20 [facility], [address] [place] [address], [id number] [place] [address]           Dept Phone: [phone]           Dept Fax:  [phone] [address] [facility]           Perla Benz          Dept Phone: [phone]           Dept Fax:  [phone]      Chief Compliant:  Chief Complaint   Patient presents with    Wrist Pain     right        History of Present Illness:  Blane Manrique returns today. This is a 70 y.o. female who presents to the clinic today for follow up of right distal radius fracture which occurred on 8/2/2021. Patient was placed in a short arm cast on 8/10/2021 which she was in until 9/9/2021. Cast was discontinued at that time and patient was transition to a Velcro wrist brace. Patient reports she tolerated the brace well is doing very good at this time she denies much pain. She notes some stiffness when she takes the brace off to shower but overall doing much better. She denies any significant swelling, numbness or tingling. No other acute complaints at this time. Review of Systems   Constitutional: Negative for fever, chills, sweats, recent illness, or recent injury. Neurological: Negative for headaches, numbness, or weakness. Integumentary: Negative for rash, itching, ecchymosis, abrasions, or laceration. Musculoskeletal: Positive for Wrist Pain (right)       Physical Exam:  Constitutional: Patient is oriented to person, place, and time. Patient appears well-developed and well nourished.    Musculoskeletal:    right Hand/Wrist    Tenderness:  None       Range of Motion:      Pronation: 80     Supination: 80     Flexion: 70     Extension: 45     Hand Joints: normal       Muscle Strength      : 5/5     Wrist Extension: 5/5     Wrist Flexion: 5/5       Sensation: normal   Phalen's Sign: Negative   Tinel's Sign (Medial Nerve): Negative Finkelstein's Test: Negative     Neurological: Patient is alert and oriented to person, place, and time. Normal strenght. No sensory deficit. Skin: Skin is warm and dry  Psychiatric: Behavior is normal. Thought content normal.  Nursing note and vitals reviewed. Labs and Imaging:     XR taken today:  No results found. X-rays taken in clinic and preliminarily reviewed by me:  3 views of the right wrist taken in clinic today demonstrate a minimally displaced intra-articular fracture with a dye punch lesion intra-articular. No interval displacement. Well within acceptable limits given patient's age. Assessment and Plan:  1. Other closed fracture of distal end of right radius with routine healing, subsequent encounter              PLAN:  This is a 70 y.o. female who presents to the clinic today for follow up distal radius fracture which occurred on 8/2/2021.  1. Patient was in a cast from 8/10/2021 until 9/9/2021. She reports some stiffness but overall is doing very well. 2. Patient demonstrates clinical union no tenderness on examination and has excellent alignment on radiographs today. 3.  Patient may discontinue splint at this time  4. For the stiffness in the wrist she is offered physical therapy versus a home exercise program elected to do the home exercise program at this time. We will see patient back on a as needed basis. Please note that this chart was generated using voice recognition Dragon dictation software. Although every effort was made to ensure the accuracy of this automated transcription, some errors in transcription may have occurred.

## 2021-09-30 ENCOUNTER — ANESTHESIA EVENT (OUTPATIENT)
Dept: ENDOSCOPY | Age: 76
End: 2021-09-30
Payer: MEDICARE

## 2021-09-30 ENCOUNTER — HOSPITAL ENCOUNTER (OUTPATIENT)
Age: 76
Setting detail: OUTPATIENT SURGERY
Discharge: HOME | End: 2021-09-30
Attending: INTERNAL MEDICINE | Admitting: INTERNAL MEDICINE
Payer: MEDICARE

## 2021-09-30 ENCOUNTER — HOSPITAL ENCOUNTER (OUTPATIENT)
Dept: ULTRASOUND IMAGING | Age: 76
Discharge: HOME | End: 2021-10-02
Attending: INTERNAL MEDICINE
Payer: MEDICARE

## 2021-09-30 ENCOUNTER — ANESTHESIA (OUTPATIENT)
Dept: ENDOSCOPY | Age: 76
End: 2021-09-30
Payer: MEDICARE

## 2021-09-30 VITALS
SYSTOLIC BLOOD PRESSURE: 102 MMHG | OXYGEN SATURATION: 98 % | DIASTOLIC BLOOD PRESSURE: 90 MMHG | RESPIRATION RATE: 24 BRPM

## 2021-09-30 VITALS
OXYGEN SATURATION: 94 % | HEART RATE: 71 BPM | RESPIRATION RATE: 17 BRPM | HEIGHT: 63 IN | BODY MASS INDEX: 26.22 KG/M2 | WEIGHT: 148 LBS | TEMPERATURE: 99.1 F | DIASTOLIC BLOOD PRESSURE: 74 MMHG | SYSTOLIC BLOOD PRESSURE: 138 MMHG

## 2021-09-30 DIAGNOSIS — R10.9 ABDOMINAL PAIN, UNSPECIFIED ABDOMINAL LOCATION: ICD-10-CM

## 2021-09-30 PROCEDURE — 7100000010 HC PHASE II RECOVERY - FIRST 15 MIN: Performed by: INTERNAL MEDICINE

## 2021-09-30 PROCEDURE — 3700000001 HC ADD 15 MINUTES (ANESTHESIA): Performed by: INTERNAL MEDICINE

## 2021-09-30 PROCEDURE — 6360000002 HC RX W HCPCS: Performed by: NURSE ANESTHETIST, CERTIFIED REGISTERED

## 2021-09-30 PROCEDURE — 3609020800 HC EGD W/EUS FNA: Performed by: INTERNAL MEDICINE

## 2021-09-30 PROCEDURE — 3700000000 HC ANESTHESIA ATTENDED CARE: Performed by: INTERNAL MEDICINE

## 2021-09-30 PROCEDURE — 2709999900 HC NON-CHARGEABLE SUPPLY: Performed by: INTERNAL MEDICINE

## 2021-09-30 PROCEDURE — 2500000003 HC RX 250 WO HCPCS: Performed by: NURSE ANESTHETIST, CERTIFIED REGISTERED

## 2021-09-30 PROCEDURE — 7100000011 HC PHASE II RECOVERY - ADDTL 15 MIN: Performed by: INTERNAL MEDICINE

## 2021-09-30 PROCEDURE — 2580000003 HC RX 258: Performed by: NURSE ANESTHETIST, CERTIFIED REGISTERED

## 2021-09-30 PROCEDURE — 2580000003 HC RX 258: Performed by: INTERNAL MEDICINE

## 2021-09-30 PROCEDURE — 76975 GI ENDOSCOPIC ULTRASOUND: CPT

## 2021-09-30 RX ORDER — SODIUM CHLORIDE 9 MG/ML
INJECTION, SOLUTION INTRAVENOUS ONCE
Status: COMPLETED | OUTPATIENT
Start: 2021-09-30 | End: 2021-09-30

## 2021-09-30 RX ORDER — TRAZODONE HYDROCHLORIDE 100 MG/1
50 TABLET ORAL NIGHTLY
COMMUNITY

## 2021-09-30 RX ORDER — PROPOFOL 10 MG/ML
INJECTION, EMULSION INTRAVENOUS PRN
Status: DISCONTINUED | OUTPATIENT
Start: 2021-09-30 | End: 2021-09-30 | Stop reason: SDUPTHER

## 2021-09-30 RX ORDER — SODIUM CHLORIDE 9 MG/ML
INJECTION, SOLUTION INTRAVENOUS CONTINUOUS PRN
Status: DISCONTINUED | OUTPATIENT
Start: 2021-09-30 | End: 2021-09-30 | Stop reason: SDUPTHER

## 2021-09-30 RX ORDER — PROPOFOL 10 MG/ML
INJECTION, EMULSION INTRAVENOUS CONTINUOUS PRN
Status: DISCONTINUED | OUTPATIENT
Start: 2021-09-30 | End: 2021-09-30 | Stop reason: SDUPTHER

## 2021-09-30 RX ORDER — LIDOCAINE HYDROCHLORIDE 10 MG/ML
INJECTION, SOLUTION EPIDURAL; INFILTRATION; INTRACAUDAL; PERINEURAL PRN
Status: DISCONTINUED | OUTPATIENT
Start: 2021-09-30 | End: 2021-09-30 | Stop reason: SDUPTHER

## 2021-09-30 RX ORDER — ATORVASTATIN CALCIUM 20 MG/1
20 TABLET, FILM COATED ORAL DAILY
COMMUNITY

## 2021-09-30 RX ORDER — CIPROFLOXACIN 2 MG/ML
INJECTION, SOLUTION INTRAVENOUS PRN
Status: DISCONTINUED | OUTPATIENT
Start: 2021-09-30 | End: 2021-09-30 | Stop reason: SDUPTHER

## 2021-09-30 RX ADMIN — CIPROFLOXACIN 400 MG: 2 INJECTION, SOLUTION INTRAVENOUS at 15:20

## 2021-09-30 RX ADMIN — LIDOCAINE HYDROCHLORIDE 50 MG: 10 INJECTION, SOLUTION EPIDURAL; INFILTRATION; INTRACAUDAL; PERINEURAL at 15:14

## 2021-09-30 RX ADMIN — SODIUM CHLORIDE: 9 INJECTION, SOLUTION INTRAVENOUS at 15:11

## 2021-09-30 RX ADMIN — PROPOFOL 30 MG: 10 INJECTION, EMULSION INTRAVENOUS at 15:17

## 2021-09-30 RX ADMIN — PROPOFOL 150 MCG/KG/MIN: 10 INJECTION, EMULSION INTRAVENOUS at 15:14

## 2021-09-30 RX ADMIN — PROPOFOL 70 MG: 10 INJECTION, EMULSION INTRAVENOUS at 15:14

## 2021-09-30 RX ADMIN — SODIUM CHLORIDE: 9 INJECTION, SOLUTION INTRAVENOUS at 13:15

## 2021-09-30 ASSESSMENT — PAIN SCALES - GENERAL
PAINLEVEL_OUTOF10: 0

## 2021-09-30 ASSESSMENT — PAIN - FUNCTIONAL ASSESSMENT: PAIN_FUNCTIONAL_ASSESSMENT: 0-10

## 2021-09-30 NOTE — ANESTHESIA PRE PROCEDURE
Department of Anesthesiology  Preprocedure Note       Name:  Krysta Levin   Age:  70 y.o.  :  1945                                          MRN:  [id number]         Date:  2021      Surgeon: Kimmie Merino): Akira Bell MD    Procedure: Procedure(s):  ENDOSCOPIC ULTRASOUND    Medications prior to admission:   Prior to Admission medications    Medication Sig Start Date End Date Taking? Authorizing Provider   traZODone (DESYREL) 100 MG tablet Take 50 mg by mouth nightly Breaks to half daose   Yes Historical Provider, MD   atorvastatin (LIPITOR) 20 MG tablet Take 20 mg by mouth daily   Yes Historical Provider, MD   vitamin E 400 UNIT capsule Take 400 Units by mouth 2 times daily   Yes Historical Provider, MD   oxybutynin (DITROPAN-XL) 5 MG extended release tablet Take 5 mg by mouth daily 21  Yes Historical Provider, MD   pantoprazole (PROTONIX) 40 MG tablet Take 40 mg by mouth 2 times daily 21  Yes Historical Provider, MD   PARoxetine (PAXIL) 10 MG tablet Take 10 mg by mouth nightly    Yes Historical Provider, MD   zolpidem (AMBIEN) 5 MG tablet Take 5 mg by mouth nightly as needed   Yes Historical Provider, MD       Current medications:    No current facility-administered medications for this encounter. Allergies: Allergies   Allergen Reactions    Sulfa Antibiotics        Problem List:  There is no problem list on file for this patient.       Past Medical History:        Diagnosis Date    Anxiety     Fatty liver     History of COVID-19 2020    headache, cough, fatigue x 3 weeks, no hospitalization    Hyperlipidemia     MVP (mitral valve prolapse)        Past Surgical History:        Procedure Laterality Date    HYSTERECTOMY      KNEE SURGERY      [place] INCIS/DRAIN THIGH/KNEE ABSCESS,DEEP Right 2017    THIGH DEBRIDEMENT INCISION AND DRAINAGE AND VAGINA DEBRIDEMENT INCISION AND DRAINAGE performed by Russell Posadas DO at [place] [address] History:    Social History Tobacco Use    Smoking status: Never Smoker    Smokeless tobacco: Never Used   Substance Use Topics    Alcohol use: No                                Counseling given: Not Answered      Vital Signs (Current):   Vitals:    09/30/21 1252   BP: 128/81   Pulse: 77   Resp: 19   Temp: 98.4 °F (36.9 °C)   TempSrc: Temporal   SpO2: 98%   Weight: 148 lb (67.1 kg)   Height: 5' 3\" (1.6 m)                                              BP Readings from Last 3 Encounters:   09/30/21 128/81   08/02/21 (!) 178/90   08/24/17 133/67       NPO Status:                                                                                 BMI:   Wt Readings from Last 3 Encounters:   09/30/21 148 lb (67.1 kg)   09/23/21 153 lb (69.4 kg)   09/08/21 153 lb (69.4 kg)     Body mass index is 26.22 kg/m². CBC:   Lab Results   Component Value Date    WBC 7.0 08/24/2017    RBC 4.04 08/24/2017    HGB 12.6 08/24/2017    HCT 38.2 08/24/2017    MCV 94.5 08/24/2017    RDW 13.9 08/24/2017     08/24/2017       CMP:   Lab Results   Component Value Date     08/24/2017    K 3.7 08/24/2017     08/24/2017    CO2 19 08/24/2017    BUN 10 08/24/2017    CREATININE 0.50 08/24/2017    GFRAA >60 08/24/2017    LABGLOM >60 08/24/2017    GLUCOSE 93 08/24/2017    PROT 7.5 08/21/2017    CALCIUM 8.6 08/24/2017    BILITOT 0.49 08/21/2017    ALKPHOS 75 08/21/2017    AST 17 08/21/2017    ALT 14 08/21/2017       POC Tests: No results for input(s): POCGLU, POCNA, POCK, POCCL, POCBUN, POCHEMO, POCHCT in the last 72 hours.     Coags: No results found for: PROTIME, INR, APTT    HCG (If Applicable): No results found for: PREGTESTUR, PREGSERUM, HCG, HCGQUANT     ABGs: No results found for: PHART, PO2ART, ORP3JNV, DYG7FKM, BEART, D0HWMGSV     Type & Screen (If Applicable):  No results found for: LABABO, LABRH    Drug/Infectious Status (If Applicable):  No results found for: HIV, HEPCAB    COVID-19 Screening (If Applicable): No results found for: COVID19        Anesthesia Evaluation  Patient summary reviewed and Nursing notes reviewed no history of anesthetic complications:   Airway: Mallampati: II  TM distance: >3 FB   Neck ROM: full  Mouth opening: > = 3 FB Dental: normal exam         Pulmonary:Negative Pulmonary ROS and normal exam                               Cardiovascular:  Exercise tolerance: good (>4 METS),   (+) hyperlipidemia    (-) past MI, CAD, CABG/stent, dysrhythmias,  angina and  CHF      Rhythm: regular  Rate: normal                    Neuro/Psych:   Negative Neuro/Psych ROS              GI/Hepatic/Renal:   (+) liver disease:,           Endo/Other: Negative Endo/Other ROS                    Abdominal:             Vascular: Other Findings:             Anesthesia Plan      TIVA     ASA 2       Induction: intravenous. MIPS: Postoperative opioids intended and Prophylactic antiemetics administered. Anesthetic plan and risks discussed with patient.       Plan discussed with CRNA and surgical team.                  Austen Segura MD   9/30/2021

## 2021-09-30 NOTE — PROGRESS NOTES
Blood drawn for DNA test group.   Electronically signed by Karen Jeffries RN on 9/30/21 at 3:03 PM EDT

## 2021-09-30 NOTE — H&P
History and Physical    Pt Name: Brit Sherman  MRN: [id number]  YOB: 1945  Date of evaluation: 9/30/2021  Primary Care Physician: Carlos Hernandez MD    SUBJECTIVE:   History of Chief Complaint:    Brit Sherman is a 70 y.o. female who is scheduled today for ENDOSCOPIC ULTRASOUND. Patient reports since May 2021, she has had intermittent nausea, fatigue, bloating, and generalized abdominal pain. Patient adds she has also been having foul smelling diarrhea for the last two months, as well as loss of appetite. Patient denies vomiting or rectal bleeding. She states the PPI she takes has moderately helped her symptoms. Patient states on recent imaging study, she was found to have fatty liver, a polyp to her bladder, and cyst to her pancreas. Allergies  is allergic to sulfa antibiotics. Medications  Prior to Admission medications    Medication Sig Start Date End Date Taking? Authorizing Provider   traZODone (DESYREL) 100 MG tablet Take 50 mg by mouth nightly Breaks to half daose   Yes Historical Provider, MD   atorvastatin (LIPITOR) 20 MG tablet Take 20 mg by mouth daily   Yes Historical Provider, MD   vitamin E 400 UNIT capsule Take 400 Units by mouth 2 times daily   Yes Historical Provider, MD   oxybutynin (DITROPAN-XL) 5 MG extended release tablet Take 5 mg by mouth daily 7/30/21  Yes Historical Provider, MD   pantoprazole (PROTONIX) 40 MG tablet Take 40 mg by mouth 2 times daily 7/17/21  Yes Historical Provider, MD   PARoxetine (PAXIL) 10 MG tablet Take 10 mg by mouth nightly    Yes Historical Provider, MD   zolpidem (AMBIEN) 5 MG tablet Take 5 mg by mouth nightly as needed   Yes Historical Provider, MD     Past Medical History    has a past medical history of Anxiety, Fatty liver, History of COVID-19, Hyperlipidemia, and MVP (mitral valve prolapse). Past Surgical History   has a past surgical history that includes knee surgery;  Hysterectomy; and pr incis/drain thigh/knee abscess,deep (Right, 2017). Social History   reports that she has never smoked. She has never used smokeless tobacco.   reports no history of alcohol use. reports no history of drug use. Marital Status   Children 2  Occupation retired  Family History  Family Status   Relation Name Status    Mother     Paula Father       family history includes Alzheimer's Disease in her father; Christy Reyes in her mother. Review of Systems:  CONSTITUTIONAL:   negative for fevers, chills, fatigue and malaise    EYES:   negative for double vision, blurred vision and photophobia    HEENT:   negative for tinnitus, epistaxis and sore throat     RESPIRATORY:   negative for cough, shortness of breath, wheezing     CARDIOVASCULAR:   negative for chest pain, palpitations, syncope, edema     GASTROINTESTINAL:   negative for nausea, vomiting reports abdominal pain, diarrhea, loss of appetite, bloating   GENITOURINARY:   negative for incontinence     MUSCULOSKELETAL:   negative for neck or back pain     NEUROLOGICAL:   Negative for weakness and tingling  negative for headaches and dizziness     PSYCHIATRIC:   negative for anxiety       OBJECTIVE:   VITALS:  height is 5' 3\" (1.6 m) and weight is 148 lb (67.1 kg). Her temporal temperature is 98.4 °F (36.9 °C). Her blood pressure is 128/81 and her pulse is 77. Her respiration is 19 and oxygen saturation is 98%. CONSTITUTIONAL:alert & oriented x 3, no acute distress. Calm and pleasant. SKIN:  Warm and dry, no rashes to exposed areas of skin. HEAD:  Normocephalic, atraumatic. EYES: PERRL. EOMs intact. Wearing glasses. EARS:  Intact and equal bilaterally. No edema or thickening, without lumps, lesions, or discharge. Hearing grossly WNL. NOSE:  Nares patent. No rhinorrhea. MOUTH/THROAT:  Mucous membranes pink and moist, uvula midline, teeth appear to be intact. NECK: Supple, no lymphadenopathy. LUNGS: Respirations even and non-labored.  Clear to auscultation bilaterally,

## 2022-08-26 ENCOUNTER — OFFICE VISIT (OUTPATIENT)
Dept: ORTHOPEDIC SURGERY | Age: 77
End: 2022-08-26
Payer: MEDICARE

## 2022-08-26 VITALS — BODY MASS INDEX: 26.22 KG/M2 | RESPIRATION RATE: 14 BRPM | WEIGHT: 148 LBS | HEIGHT: 63 IN

## 2022-08-26 DIAGNOSIS — M25.561 ACUTE PAIN OF RIGHT KNEE: Primary | ICD-10-CM

## 2022-08-26 DIAGNOSIS — Z96.653 HISTORY OF TOTAL KNEE ARTHROPLASTY, BILATERAL: ICD-10-CM

## 2022-08-26 DIAGNOSIS — S80.01XA CONTUSION OF RIGHT KNEE, INITIAL ENCOUNTER: ICD-10-CM

## 2022-08-26 DIAGNOSIS — M16.11 PRIMARY OSTEOARTHRITIS OF RIGHT HIP: ICD-10-CM

## 2022-08-26 PROCEDURE — G8428 CUR MEDS NOT DOCUMENT: HCPCS | Performed by: PHYSICIAN ASSISTANT

## 2022-08-26 PROCEDURE — 1123F ACP DISCUSS/DSCN MKR DOCD: CPT | Performed by: PHYSICIAN ASSISTANT

## 2022-08-26 PROCEDURE — 1036F TOBACCO NON-USER: CPT | Performed by: PHYSICIAN ASSISTANT

## 2022-08-26 PROCEDURE — 1090F PRES/ABSN URINE INCON ASSESS: CPT | Performed by: PHYSICIAN ASSISTANT

## 2022-08-26 PROCEDURE — 3017F COLORECTAL CA SCREEN DOC REV: CPT | Performed by: PHYSICIAN ASSISTANT

## 2022-08-26 PROCEDURE — 99214 OFFICE O/P EST MOD 30 MIN: CPT | Performed by: PHYSICIAN ASSISTANT

## 2022-08-26 PROCEDURE — G8417 CALC BMI ABV UP PARAM F/U: HCPCS | Performed by: PHYSICIAN ASSISTANT

## 2022-08-26 PROCEDURE — G8400 PT W/DXA NO RESULTS DOC: HCPCS | Performed by: PHYSICIAN ASSISTANT

## 2022-08-26 RX ORDER — DICYCLOMINE HYDROCHLORIDE 10 MG/1
10 CAPSULE ORAL 3 TIMES DAILY PRN
COMMUNITY
Start: 2022-06-30

## 2022-08-26 RX ORDER — PAROXETINE HYDROCHLORIDE 20 MG/1
20 TABLET, FILM COATED ORAL DAILY
COMMUNITY
Start: 2022-06-17

## 2022-08-26 RX ORDER — METHYLPREDNISOLONE 4 MG/1
4 TABLET ORAL SEE ADMIN INSTRUCTIONS
Qty: 1 KIT | Refills: 0 | Status: SHIPPED | OUTPATIENT
Start: 2022-08-26 | End: 2022-09-01

## 2022-08-26 NOTE — PROGRESS NOTES
[address] [facility], [address] [place], [address] [place] [address], [address] [facility]           Dept Phone: [phone]           Dept Fax:  [phone]           Perla Benz          Dept Phone: [phone]           Dept Fax:  [phone]      Chief Compliant:  Chief Complaint   Patient presents with    Knee Injury     Right        History of Present Illness: This is a 78 y.o. female who presents to the clinic today for evaluation of acute right knee pain and chronic right hip pain. Patient reports a few weeks ago she slipped and hit the anterior portion of her knee on a fire hydrant she initially did not think much about it but continued to have pain over the last few weeks which brought her to be evaluated. Patient reports the pain in her knee is most significant in the anterior aspect pointing to the patellar and quadriceps tendon area. Pain is aggravated by walking and standing for any extended period time. Patient with history of bilateral total knee arthroplasty right total knee was done in [place] in 2012. She states that she had no issues with this knee prior to this incident that occurred a few weeks ago. Patient denies any joint warmth, redness, fever or chills. Right hip pain:  Patient reports history of chronic right hip pain she states she did have a CT of the abdomen and pelvis for other reasons but on the CT she is found to have fairly advanced osteoarthritis of this right hip. She reports pain in the right hip is most severe to the posterior aspect but does note intermittent groin pain. Patient denies any injury or trauma no hip joint warmth, redness, fever or chills.     Past History:    Current Outpatient Medications:     PARoxetine (PAXIL) 20 MG tablet, Take 20 mg by mouth daily, Disp: , Rfl:     dicyclomine (BENTYL) 10 MG capsule, Take 10 mg by mouth 3 times daily as needed, Disp: , Rfl:     traZODone (DESYREL) 100 MG tablet, Take 50 mg by mouth nightly Breaks to half daose, Disp: , Rfl:     atorvastatin (LIPITOR) 20 MG tablet, Take 20 mg by mouth daily, Disp: , Rfl:     vitamin E 400 UNIT capsule, Take 400 Units by mouth 2 times daily, Disp: , Rfl:     oxybutynin (DITROPAN-XL) 5 MG extended release tablet, Take 5 mg by mouth daily, Disp: , Rfl:     pantoprazole (PROTONIX) 40 MG tablet, Take 40 mg by mouth 2 times daily, Disp: , Rfl:     PARoxetine (PAXIL) 10 MG tablet, Take 10 mg by mouth nightly , Disp: , Rfl:     zolpidem (AMBIEN) 5 MG tablet, Take 5 mg by mouth nightly as needed, Disp: , Rfl:   Allergies   Allergen Reactions    Sulfa Antibiotics      Social History     Socioeconomic History    Marital status:       Spouse name: Not on file    Number of children: Not on file    Years of education: Not on file    Highest education level: Not on file   Occupational History    Not on file   Tobacco Use    Smoking status: Never    Smokeless tobacco: Never   Substance and Sexual Activity    Alcohol use: No    Drug use: No    Sexual activity: Not on file   Other Topics Concern    Not on file   Social History Narrative    Not on file     Social Determinants of Health     Financial Resource Strain: Not on file   Food Insecurity: Not on file   Transportation Needs: Not on file   Physical Activity: Not on file   Stress: Not on file   Social Connections: Not on file   Intimate Partner Violence: Not on file   Housing Stability: Not on file     Past Medical History:   Diagnosis Date    Anxiety     Fatty liver     History of COVID-19 11/2020    headache, cough, fatigue x 3 weeks, no hospitalization    Hyperlipidemia     MVP (mitral valve prolapse)      Past Surgical History:   Procedure Laterality Date    HYSTERECTOMY      KNEE SURGERY      [place] INCIS/DRAIN THIGH/KNEE ABSCESS,DEEP Right 8/23/2017    THIGH DEBRIDEMENT INCISION AND DRAINAGE AND VAGINA DEBRIDEMENT INCISION AND DRAINAGE performed by Niurka Frederick DO at [address]  9/30/2021    EGD W/EUS FNA performed by Lurene Goldberg, MD at [facility]     Family History   Problem Relation Age of Onset    Lung Cancer Mother     Alzheimer's Disease Father         Review of Systems   Constitutional: Negative for fever, chills, sweats. Eyes: Negative for changes in vision, or pain. HENT: Negative for ear ache, epistaxis, or sore throat. Respiratory/Cardio: Negative for Chest pain, palpitations, SOB, or cough. Gastrointestinal: Negative for abdominal pain, N/V/D. Genitourinary: Negative for dysuria, frequency, urgency, or hematuria. Neurological: Negative for headache, numbness, or weakness. Integumentary: Negative for rash, itching, laceration, or abrasion. Musculoskeletal: Positive for Knee Injury (Right)       Physical Exam:  Constitutional: Patient is oriented to person, place, and time. Patient appears well-developed and well nourished. HENT: Negative otherwise noted  Head: Normocephalic and Atraumatic  Nose: Normal  Eyes: Conjunctivae and EOM are normal  Neck: Normal range of motion Neck supple. Respiratory/Cardio: Effort normal. No respiratory distress. Musculoskeletal:    right Hip    Tenderness: Minimal tenderness over the right greater trochanter. Mild tenderness over the proximal IT band. No tenderness to the anterior posterior hip       Range of Motion:      Extension: 10     Flexion: 80     Internal Rotation: 5     External Rotation: 20     Abduction: 40     Adduction:  30       Muscle Strength      Abduction: 5/5     Adduction: 5/5     Flexion: 4/5         Gait: Antalgic   Saba Test: Positive   Josephine Test: Negative       Neurological: Patient is alert and oriented to person, place, and time. Normal strenght. No sensory deficit.   Skin: Skin is warm and dry  Psychiatric: Behavior is normal. Thought content normal.  Nursing note and vitals reviewed. Labs and Imaging:     XR taken today:  No results found. X-rays taken in clinic today and preliminarily reviewed by me 8/26/22:  AP pelvis lateral view of the right hip demonstrates fairly advanced degenerative changes of the right hip with cystic changes in the acetabulum and the superior femoral head. Cam and pincer osteophytes present. Left hip appeared age-appropriate with minimal degenerative changes. Moderate degenerative changes of lumbosacral area visualized on AP view. AP bilateral knees standing lateral view of the right knee demonstrates patient status post right total knee arthroplasty. Components appear in excellent position without any evidence of hardware complication no evidence of periprosthetic fracture. Left knee seen on AP view was also status post total knee arthroplasty which appears in appropriate position. Orders Placed This Encounter   Procedures    XR KNEE RIGHT (1-2 VIEWS)     Standing Status:   Future     Number of Occurrences:   1     Standing Expiration Date:   8/25/2023    XR HIP 1 VW W PELVIS RIGHT     Standing Status:   Future     Number of Occurrences:   1     Standing Expiration Date:   8/26/2023       Assessment and Plan:  1. Acute pain of right knee    2. Contusion of right knee, initial encounter    3. Primary osteoarthritis of right hip    4. History of total knee arthroplasty, bilateral          PLAN:  This is a 78 y.o. female who presents to the clinic today for evaluation of acute right knee pain and chronic right hip pain. Acute right knee pain history of right total knee arthroplasty in 2012  history of right total knee arthroplasty patient had a incident a few weeks ago in which she hit her knee on a fire hydrant. Knee pain is consistent with a contusion versus quadriceps tendinitis there is no evidence of extensor mechanism disruption, hardware loosening, infection or other acute osseous abnormality.   Patient is given reassurance the knee is without evidence of hardware loosening, periprosthetic fracture or instability. Recommended proceeding conservatively with home exercise program and a Medrol Dosepak which is electronically sent to patient pharmacy. Follow-up in 3 weeks for reevaluation however patient may call return sooner for any questions or concerns    Right hip OA  Right hip pain is consistent with right hip osteoarthritis. I had a discussion with the patient with regards to the nature and extent of her problem. We also discussed treatment options available to her including non-operative and operative intervention. To this end we discussed use of NSAIDs, cortisone and viscosupplementation injections, weight loss, activity modification, physical therapy, bracing, and use of assistive walking devices. We also had discussions about total hip arthroplasties. After discussion patient would like to proceed with interventional intra-articular right hip injection she is provided with a referral to interventional radiology. Please note that this chart was generated using voice recognition Dragon dictation software. Although every effort was made to ensure the accuracy of this automated transcription, some errors in transcription may have occurred.

## 2022-08-26 NOTE — PATIENT INSTRUCTIONS
Patient Education        Knee: Exercises  Introduction  Here are some examples of exercises for you to try. The exercises may be suggested for a condition or for rehabilitation. Start each exercise slowly. Ease off the exercises if you start to have pain. You will be told when to start these exercises and which ones will work bestfor you. How to do the exercises  Quad sets    Sit with your leg straight and supported on the floor or a firm bed. (If you feel discomfort in the front or back of your knee, place a small towel roll under your knee.)  Tighten the muscles on top of your thigh by pressing the back of your knee flat down to the floor. (If you feel discomfort under your kneecap, place a small towel roll under your knee.)  Hold for about 6 seconds, then rest for up to 10 seconds. Do 8 to 12 repetitions several times a day. Straight-leg raises to the front    Lie on your back with your good knee bent so that your foot rests flat on the floor. Your injured leg should be straight. Make sure that your low back has a normal curve. You should be able to slip your flat hand in between the floor and the small of your back, with your palm touching the floor and your back touching the back of your hand. Tighten the thigh muscles in the injured leg by pressing the back of your knee flat down to the floor. Hold your knee straight. Keeping the thigh muscles tight, lift your injured leg up so that your heel is about 12 inches off the floor. Hold for about 6 seconds and then lower slowly. Do 8 to 12 repetitions, 3 times a day. Straight-leg raises to the outside    Lie on your side, with your injured leg on top. Tighten the front thigh muscles of your injured leg to keep your knee straight. Keep your hip and your leg straight in line with the rest of your body, and keep your knee pointing forward. Do not drop your hip back. Lift your injured leg straight up toward the ceiling, about 12 inches off the floor.  Hold for about 6 seconds, then slowly lower your leg. Do 8 to 12 repetitions. Straight-leg raises to the back    Lie on your stomach, and lift your leg straight up behind you (toward the ceiling). Lift your toes about 6 inches off the floor, hold for about 6 seconds, then lower slowly. Do 8 to 12 repetitions. Straight-leg raises to the inside    Lie on the side of your body with the injured leg. You can either prop your other (good) leg up on a chair, or you can bend your good knee and put that foot in front of your injured knee. Do not drop your hip back. Tighten the muscles on the front of your thigh to straighten your injured knee. Keep your kneecap pointing forward, and lift your whole leg up toward the ceiling about 6 inches. Hold for about 6 seconds, then lower slowly. Do 8 to 12 repetitions. Heel dig bridging    Lie on your back with both knees bent and your ankles bent so that only your heels are digging into the floor. Your knees should be bent about 90 degrees. Then push your heels into the floor, squeeze your buttocks, and lift your hips off the floor until your shoulders, hips, and knees are all in a straight line. Hold for about 6 seconds as you continue to breathe normally, and then slowly lower your hips back down to the floor and rest for up to 10 seconds. Do 8 to 12 repetitions. Hamstring curls    Lie on your stomach with your knees straight. If your kneecap is uncomfortable, roll up a washcloth and put it under your leg just above your kneecap. Lift the foot of your injured leg by bending the knee so that you bring the foot up toward your buttock. If this motion hurts, try it without bending your knee quite as far. This may help you avoid any painful motion. Slowly lower your leg back to the floor. Do 8 to 12 repetitions. With permission from your doctor or physical therapist, you may also want to add a cuff weight to your ankle (not more than 5 pounds).  With weight, you do not have to lift your leg more than 12 inches to get a hamstring workout. Shallow standing knee bends    Do this exercise only if you have very little pain; if you have no clicking, locking, or giving way if you have an injured knee; and if it does not hurtwhile you are doing 8 to 12 repetitions. Stand with your hands lightly resting on a counter or chair in front of you. Put your feet shoulder-width apart. Slowly bend your knees so that you squat down like you are going to sit in a chair. Make sure your knees do not go in front of your toes. Lower yourself about 6 inches. Your heels should remain on the floor at all times. Rise slowly to a standing position. Heel raises    Stand with your feet a few inches apart, with your hands lightly resting on a counter or chair in front of you. Slowly raise your heels off the floor while keeping your knees straight. Hold for about 6 seconds, then slowly lower your heels to the floor. Do 8 to 12 repetitions several times during the day. Follow-up care is a key part of your treatment and safety. Be sure to make and go to all appointments, and call your doctor if you are having problems. It's also a good idea to know your test results and keep alist of the medicines you take. Where can you learn more? Go to https://[employer]. org and sign in to your [employer] account. Enter S224 in the [facility] box to learn more about \"Knee: Exercises. \"     If you do not have an account, please click on the \"Sign Up Now\" link. Current as of: March 9, 2022               Content Version: 13.3  © 3916-2652 [employer]. Care instructions adapted under license by [facility] ([facility]). If you have questions about a medical condition or this instruction, always ask your healthcare professional. [place] any warranty or liability for your use of this information.

## 2022-08-30 RX ORDER — LIDOCAINE HYDROCHLORIDE 10 MG/ML
4 INJECTION, SOLUTION EPIDURAL; INFILTRATION; INTRACAUDAL; PERINEURAL ONCE
Status: CANCELLED | OUTPATIENT
Start: 2022-08-30 | End: 2022-08-30

## 2022-08-30 RX ORDER — BUPIVACAINE HYDROCHLORIDE 5 MG/ML
4 INJECTION, SOLUTION EPIDURAL; INTRACAUDAL ONCE
Status: CANCELLED | OUTPATIENT
Start: 2022-08-30 | End: 2022-08-30

## 2022-08-30 RX ORDER — METHYLPREDNISOLONE ACETATE 80 MG/ML
80 INJECTION, SUSPENSION INTRA-ARTICULAR; INTRALESIONAL; INTRAMUSCULAR; SOFT TISSUE ONCE
Status: CANCELLED | OUTPATIENT
Start: 2022-08-30 | End: 2022-08-30

## 2022-08-31 ENCOUNTER — HOSPITAL ENCOUNTER (OUTPATIENT)
Dept: INTERVENTIONAL RADIOLOGY/VASCULAR | Age: 77
Discharge: HOME | End: 2022-09-02
Payer: MEDICARE

## 2022-08-31 DIAGNOSIS — M16.11 PRIMARY OSTEOARTHRITIS OF RIGHT HIP: ICD-10-CM

## 2022-08-31 PROCEDURE — 6360000004 HC RX CONTRAST MEDICATION: Performed by: PHYSICIAN ASSISTANT

## 2022-08-31 PROCEDURE — 6360000002 HC RX W HCPCS: Performed by: PHYSICIAN ASSISTANT

## 2022-08-31 PROCEDURE — 20610 DRAIN/INJ JOINT/BURSA W/O US: CPT

## 2022-08-31 PROCEDURE — 2500000003 HC RX 250 WO HCPCS: Performed by: PHYSICIAN ASSISTANT

## 2022-08-31 PROCEDURE — 77002 NEEDLE LOCALIZATION BY XRAY: CPT

## 2022-08-31 PROCEDURE — 2709999900 IR ARTHR/ASP/INJ MAJOR JT/BURSA RIGHT WO US

## 2022-08-31 RX ORDER — BUPIVACAINE HYDROCHLORIDE 5 MG/ML
4 INJECTION, SOLUTION EPIDURAL; INTRACAUDAL ONCE
Status: COMPLETED | OUTPATIENT
Start: 2022-08-31 | End: 2022-08-31

## 2022-08-31 RX ORDER — METHYLPREDNISOLONE ACETATE 80 MG/ML
80 INJECTION, SUSPENSION INTRA-ARTICULAR; INTRALESIONAL; INTRAMUSCULAR; SOFT TISSUE ONCE
Status: COMPLETED | OUTPATIENT
Start: 2022-08-31 | End: 2022-08-31

## 2022-08-31 RX ORDER — LIDOCAINE HYDROCHLORIDE 10 MG/ML
4 INJECTION, SOLUTION EPIDURAL; INFILTRATION; INTRACAUDAL; PERINEURAL ONCE
Status: COMPLETED | OUTPATIENT
Start: 2022-08-31 | End: 2022-08-31

## 2022-08-31 RX ADMIN — BUPIVACAINE HYDROCHLORIDE 20 MG: 5 INJECTION, SOLUTION EPIDURAL; INTRACAUDAL; PERINEURAL at 13:42

## 2022-08-31 RX ADMIN — IOVERSOL 15 ML: 741 INJECTION INTRA-ARTERIAL; INTRAVENOUS at 14:02

## 2022-08-31 RX ADMIN — METHYLPREDNISOLONE ACETATE 80 MG: 80 INJECTION, SUSPENSION INTRA-ARTICULAR; INTRALESIONAL; INTRAMUSCULAR; SOFT TISSUE at 13:43

## 2022-08-31 RX ADMIN — LIDOCAINE HYDROCHLORIDE 4 ML: 10 INJECTION, SOLUTION EPIDURAL; INFILTRATION; INTRACAUDAL; PERINEURAL at 13:43

## 2022-08-31 ASSESSMENT — PAIN SCALES - GENERAL: PAINLEVEL_OUTOF10: 0

## 2022-08-31 NOTE — DISCHARGE INSTRUCTIONS
After your arthrogram:    You should be able to return to a normal routine the next day. Please refrain from strenuous athletic activity for 24-48 hours. Your joint will return to its pre-arthrogram state within 48 hours. Most of the contrast medium will be absorbed by the first 24 hours and any gas should be absorbed by 48 hours. Slight swelling and discomfort may occur after the exam which can be relieved with ice compresses. If you develop signs of infection (redness, swelling, drainage, fever > 101 degrees) call your physician immediately. If the joint becomes red, hot and painfully swollen or itchy, please contact your physician immediately. Your doctor will have the results of this test by three working days from today. Please call that office for follow-up treatment. If you have questions, you can contact our department at [phone] for further information     IF YOU [address], GO TO THE NEAREST [address].

## 2022-08-31 NOTE — PROGRESS NOTES
Rt hip prepped draped. Numbed and accessed by Dr Charity Munoz  Injected as per [facility]  Needle removed and band aid applied Tolerated well Verbalizes understanding of discharge instructions

## 2023-04-19 ENCOUNTER — OFFICE VISIT (OUTPATIENT)
Dept: ORTHOPEDIC SURGERY | Age: 78
End: 2023-04-19
Payer: MEDICARE

## 2023-04-19 VITALS — WEIGHT: 148 LBS | RESPIRATION RATE: 14 BRPM | BODY MASS INDEX: 26.22 KG/M2 | HEIGHT: 63 IN

## 2023-04-19 DIAGNOSIS — Z96.653 HISTORY OF TOTAL KNEE ARTHROPLASTY, BILATERAL: ICD-10-CM

## 2023-04-19 DIAGNOSIS — M79.661 PAIN IN RIGHT SHIN: ICD-10-CM

## 2023-04-19 DIAGNOSIS — M25.561 ACUTE PAIN OF RIGHT KNEE: Primary | ICD-10-CM

## 2023-04-19 PROCEDURE — G8417 CALC BMI ABV UP PARAM F/U: HCPCS | Performed by: PHYSICIAN ASSISTANT

## 2023-04-19 PROCEDURE — G8427 DOCREV CUR MEDS BY ELIG CLIN: HCPCS | Performed by: PHYSICIAN ASSISTANT

## 2023-04-19 PROCEDURE — 1090F PRES/ABSN URINE INCON ASSESS: CPT | Performed by: PHYSICIAN ASSISTANT

## 2023-04-19 PROCEDURE — 1036F TOBACCO NON-USER: CPT | Performed by: PHYSICIAN ASSISTANT

## 2023-04-19 PROCEDURE — 1123F ACP DISCUSS/DSCN MKR DOCD: CPT | Performed by: PHYSICIAN ASSISTANT

## 2023-04-19 PROCEDURE — G8400 PT W/DXA NO RESULTS DOC: HCPCS | Performed by: PHYSICIAN ASSISTANT

## 2023-04-19 PROCEDURE — 3017F COLORECTAL CA SCREEN DOC REV: CPT | Performed by: PHYSICIAN ASSISTANT

## 2023-04-19 PROCEDURE — 99213 OFFICE O/P EST LOW 20 MIN: CPT | Performed by: PHYSICIAN ASSISTANT

## 2023-04-19 RX ORDER — METHOCARBAMOL 750 MG/1
750 TABLET, FILM COATED ORAL 4 TIMES DAILY
Qty: 40 TABLET | Refills: 0 | Status: SHIPPED | OUTPATIENT
Start: 2023-04-19 | End: 2023-04-29

## 2023-04-19 RX ORDER — METHOCARBAMOL 500 MG/1
500 TABLET, FILM COATED ORAL 4 TIMES DAILY
COMMUNITY
End: 2023-04-19

## 2023-04-19 NOTE — PROGRESS NOTES
[address] [facility], [address] [facility] [address], [address] [place] [address], [address] [facility]           Dept Phone: [phone]           Dept Fax:  [phone] [address] [facility]           Perla Benz          Dept Phone: [phone]           Dept Fax:  [phone]      Chief Compliant:  Chief Complaint   Patient presents with    Leg Pain     right        History of Present Illness:  Gerhardt Gave returns today. This is a 78 y.o. female who presents to the clinic today for follow up of right knee and shin pain. Patient with history of right total knee arthroplasty done 4 years ago. She has been seen for this issue the past she states he has been in karate last 6 months or so but had an incident about 4 weeks ago in which the face of a kitchen drawer fell directly onto her knee and lower leg. She had significant pain and bruising at that time has improved but only mildly. She reports that she continues to have pain localized most severely to the superior aspect of the patella and the anterior shin area. She states pain is aggravated by any prolonged period of walking or standing. She denies any joint warmth,, fever or chills. Patient just returned from [place] this week and states she was out at local urgent care while she was down there where she had an ultrasound that was negative for DVT no x-rays. She has started taking Robaxin for like a significant help with her pain requesting refill at this time. 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 Patient requesting injection today. Review of Systems   Constitutional: Negative for fever, chills, sweats, recent illness, or recent injury.    Neurological: Negative for headaches, numbness, or

## 2023-05-03 ENCOUNTER — HOSPITAL ENCOUNTER (OUTPATIENT)
Dept: INTERVENTIONAL RADIOLOGY/VASCULAR | Age: 78
Discharge: HOME | End: 2023-05-05
Payer: MEDICARE

## 2023-05-03 DIAGNOSIS — M79.661 PAIN IN RIGHT SHIN: ICD-10-CM

## 2023-05-03 DIAGNOSIS — Z96.653 HISTORY OF TOTAL KNEE ARTHROPLASTY, BILATERAL: ICD-10-CM

## 2023-05-03 DIAGNOSIS — M25.561 ACUTE PAIN OF RIGHT KNEE: ICD-10-CM

## 2023-05-03 PROCEDURE — 2709999900 IR ARTHR/ASP/INJ MAJOR JT/BURSA RIGHT WO US

## 2023-05-03 PROCEDURE — 77002 NEEDLE LOCALIZATION BY XRAY: CPT

## 2023-05-03 PROCEDURE — 6360000002 HC RX W HCPCS: Performed by: PHYSICIAN ASSISTANT

## 2023-05-03 PROCEDURE — 6360000004 HC RX CONTRAST MEDICATION: Performed by: PHYSICIAN ASSISTANT

## 2023-05-03 PROCEDURE — 2500000003 HC RX 250 WO HCPCS: Performed by: PHYSICIAN ASSISTANT

## 2023-05-03 PROCEDURE — 20610 DRAIN/INJ JOINT/BURSA W/O US: CPT

## 2023-05-03 RX ORDER — LIDOCAINE HYDROCHLORIDE 10 MG/ML
4 INJECTION, SOLUTION EPIDURAL; INFILTRATION; INTRACAUDAL; PERINEURAL ONCE
Status: COMPLETED | OUTPATIENT
Start: 2023-05-03 | End: 2023-05-03

## 2023-05-03 RX ORDER — METHYLPREDNISOLONE ACETATE 80 MG/ML
80 INJECTION, SUSPENSION INTRA-ARTICULAR; INTRALESIONAL; INTRAMUSCULAR; SOFT TISSUE ONCE
Status: COMPLETED | OUTPATIENT
Start: 2023-05-03 | End: 2023-05-03

## 2023-05-03 RX ORDER — BUPIVACAINE HYDROCHLORIDE 5 MG/ML
4 INJECTION, SOLUTION PERINEURAL ONCE
Status: COMPLETED | OUTPATIENT
Start: 2023-05-03 | End: 2023-05-03

## 2023-05-03 RX ADMIN — IOVERSOL 12 ML: 741 INJECTION INTRA-ARTERIAL; INTRAVENOUS at 13:58

## 2023-05-03 RX ADMIN — BUPIVACAINE HYDROCHLORIDE 20 MG: 5 INJECTION, SOLUTION EPIDURAL; INTRACAUDAL; PERINEURAL at 13:31

## 2023-05-03 RX ADMIN — METHYLPREDNISOLONE ACETATE 80 MG: 80 INJECTION, SUSPENSION INTRA-ARTICULAR; INTRALESIONAL; INTRAMUSCULAR; SOFT TISSUE at 13:33

## 2023-05-03 RX ADMIN — LIDOCAINE HYDROCHLORIDE 4 ML: 10 INJECTION, SOLUTION EPIDURAL; INFILTRATION; INTRACAUDAL; PERINEURAL at 13:34

## 2023-05-03 ASSESSMENT — PAIN SCALES - GENERAL: PAINLEVEL_OUTOF10: 0

## 2023-05-03 NOTE — BRIEF OP NOTE
Brief Postoperative Note    Moustapha Islas  YOB: 1945  [id number]    Pre-operative Diagnosis: right hip pain    Post-operative Diagnosis: Same    Procedure: Fluoro guided therapeutic injection    Anesthesia: Local    Surgeons/Assistants: Nader    Estimated Blood Loss: less than 50     Complications: None    Specimens: Was Not Obtained     Electronically signed by Kassandra Cervantes MD on 5/3/2023 at 1:49 PM

## 2023-05-03 NOTE — PROGRESS NOTES
Rt hip prepped draped. Numbed and accessed by Dr Tabatha Quiñones. Injected as per [facility].  Needle removed and band aid applied Tolerated well Verbalizes understanding of discharge instructions

## 2023-11-01 ENCOUNTER — HOSPITAL ENCOUNTER (OUTPATIENT)
Dept: WOMENS IMAGING | Age: 78
Discharge: HOME | End: 2023-11-03
Payer: MEDICARE

## 2023-11-01 DIAGNOSIS — Z12.31 ENCOUNTER FOR SCREENING MAMMOGRAM FOR MALIGNANT NEOPLASM OF BREAST: ICD-10-CM

## 2023-11-01 PROCEDURE — 77067 SCR MAMMO BI INCL CAD: CPT

## (undated) DEVICE — ENDOSCOPIC ULTRASOUND ASPIRATION NEEDLE: Brand: EXPECT SLIMLINE SL

## (undated) DEVICE — 20 ML SYRINGE LUER-LOCK TIP: Brand: MONOJECT

## (undated) DEVICE — HYPODERMIC SAFETY NEEDLE: Brand: MAGELLAN

## (undated) DEVICE — PENCIL ES L3M BTTN SWCH HOLSTER W/ BLDE ELECTRD EDGE

## (undated) DEVICE — GAUZE,PACKING STRIP,IODOFORM,1/2"X5YD,ST: Brand: CURAD

## (undated) DEVICE — SWAB CULT DBL W/O CHAR RAYON TIP AMIES GEL CLMN FOR COLL

## (undated) DEVICE — GLOVE SURG SZ 65 STD WHT LTX SYN POLYMER BEAD REINF ANTI RL

## (undated) DEVICE — CONVERTED USE 248065 TOWELS OR BL ST

## (undated) DEVICE — COLLECTOR SPEC RAYON LIQ STUART DBL FOR THRT VAG SKIN WND

## (undated) DEVICE — AMBU AURASTRAIGHT U SIZE 4: Brand: AURASTRAIGHT

## (undated) DEVICE — SURGICAL PROCEDURE PACK PLAS C

## (undated) DEVICE — POOLE SUCTION HANDLE: Brand: CARDINAL HEALTH

## (undated) DEVICE — COVER LT HNDL BLU PLAS

## (undated) DEVICE — NO USE 18 MONTHS GOWN STD LG  1153D

## (undated) DEVICE — GAUZE,SPONGE,4"X4",16PLY,XRAY,STRL,LF: Brand: MEDLINE

## (undated) DEVICE — PAD,ABDOMINAL,8"X7.5",ST,LF,20/BX: Brand: MEDLINE INDUSTRIES, INC.

## (undated) DEVICE — GAUZE,SPONGE,FLUFF,6"X6.75",STRL,5/TRAY: Brand: MEDLINE

## (undated) DEVICE — INTENDED FOR TISSUE SEPARATION, AND OTHER PROCEDURES THAT REQUIRE A SHARP SURGICAL BLADE TO PUNCTURE OR CUT.: Brand: BARD-PARKER ® CARBON RIB-BACK BLADES

## (undated) DEVICE — Z DISCONTINUED BY MEDLINE USE 2711682 TRAY SKIN PREP DRY W/ PREM GLV

## (undated) DEVICE — STERILE LATEX POWDER-FREE SURGICAL GLOVESWITH NITRILE COATING: Brand: PROTEXIS

## (undated) DEVICE — MEDI-VAC NON-CONDUCTIVE SUCTION TUBING: Brand: CARDINAL HEALTH

## (undated) DEVICE — SOLUTION IV IRRIG POUR BRL 0.9% SODIUM CHL 2F7124

## (undated) DEVICE — SURGICAL PROCEDURE PACK PERI-GYN 6892E] SURGICAL EXPRESS]

## (undated) DEVICE — CONVERTED USE 338908 SPONGES LAP 18X18 ST

## (undated) DEVICE — 1810 FOAM BLOCK NEEDLE COUNTER: Brand: DEVON